# Patient Record
Sex: FEMALE | Race: ASIAN | NOT HISPANIC OR LATINO | URBAN - METROPOLITAN AREA
[De-identification: names, ages, dates, MRNs, and addresses within clinical notes are randomized per-mention and may not be internally consistent; named-entity substitution may affect disease eponyms.]

---

## 2019-06-27 ENCOUNTER — EMERGENCY (EMERGENCY)
Facility: HOSPITAL | Age: 24
LOS: 1 days | Discharge: STILL ADMITTED OR ADMINISTRATIVE | End: 2019-06-27
Admitting: EMERGENCY MEDICINE

## 2019-06-27 ENCOUNTER — INPATIENT (INPATIENT)
Facility: HOSPITAL | Age: 24
LOS: 3 days | Discharge: HOME | End: 2019-07-01
Attending: OBSTETRICS & GYNECOLOGY | Admitting: OBSTETRICS & GYNECOLOGY
Payer: MEDICAID

## 2019-06-27 VITALS
RESPIRATION RATE: 16 BRPM | HEART RATE: 90 BPM | DIASTOLIC BLOOD PRESSURE: 79 MMHG | TEMPERATURE: 98 F | SYSTOLIC BLOOD PRESSURE: 121 MMHG | OXYGEN SATURATION: 98 %

## 2019-06-27 VITALS
RESPIRATION RATE: 16 BRPM | OXYGEN SATURATION: 100 % | HEART RATE: 81 BPM | DIASTOLIC BLOOD PRESSURE: 78 MMHG | TEMPERATURE: 98 F | SYSTOLIC BLOOD PRESSURE: 119 MMHG

## 2019-06-27 DIAGNOSIS — O26.899 OTHER SPECIFIED PREGNANCY RELATED CONDITIONS, UNSPECIFIED TRIMESTER: ICD-10-CM

## 2019-06-27 DIAGNOSIS — Z3A.00 WEEKS OF GESTATION OF PREGNANCY NOT SPECIFIED: ICD-10-CM

## 2019-06-27 LAB
ALBUMIN SERPL ELPH-MCNC: 3.2 G/DL — LOW (ref 3.3–5)
ALP SERPL-CCNC: 150 U/L — HIGH (ref 40–120)
ALT FLD-CCNC: 18 U/L — SIGNIFICANT CHANGE UP (ref 4–33)
AMPHET UR-MCNC: NEGATIVE — SIGNIFICANT CHANGE UP
ANION GAP SERPL CALC-SCNC: 13 MMO/L — SIGNIFICANT CHANGE UP (ref 7–14)
APPEARANCE UR: CLEAR — SIGNIFICANT CHANGE UP
APTT BLD: 27 SEC — LOW (ref 27.5–36.3)
AST SERPL-CCNC: 17 U/L — SIGNIFICANT CHANGE UP (ref 4–32)
BACTERIA # UR AUTO: NEGATIVE — SIGNIFICANT CHANGE UP
BARBITURATES UR SCN-MCNC: NEGATIVE — SIGNIFICANT CHANGE UP
BASOPHILS # BLD AUTO: 0.03 K/UL — SIGNIFICANT CHANGE UP (ref 0–0.2)
BASOPHILS NFR BLD AUTO: 0.2 % — SIGNIFICANT CHANGE UP (ref 0–2)
BENZODIAZ UR-MCNC: NEGATIVE — SIGNIFICANT CHANGE UP
BILIRUB SERPL-MCNC: < 0.2 MG/DL — LOW (ref 0.2–1.2)
BILIRUB UR-MCNC: NEGATIVE — SIGNIFICANT CHANGE UP
BLOOD UR QL VISUAL: SIGNIFICANT CHANGE UP
BUN SERPL-MCNC: 12 MG/DL — SIGNIFICANT CHANGE UP (ref 7–23)
CALCIUM SERPL-MCNC: 9.7 MG/DL — SIGNIFICANT CHANGE UP (ref 8.4–10.5)
CANNABINOIDS UR-MCNC: NEGATIVE — SIGNIFICANT CHANGE UP
CHLORIDE SERPL-SCNC: 104 MMOL/L — SIGNIFICANT CHANGE UP (ref 98–107)
CO2 SERPL-SCNC: 19 MMOL/L — LOW (ref 22–31)
COCAINE METAB.OTHER UR-MCNC: NEGATIVE — SIGNIFICANT CHANGE UP
COLOR SPEC: YELLOW — SIGNIFICANT CHANGE UP
CREAT ?TM UR-MCNC: 173.8 MG/DL — SIGNIFICANT CHANGE UP
CREAT SERPL-MCNC: 0.55 MG/DL — SIGNIFICANT CHANGE UP (ref 0.5–1.3)
EOSINOPHIL # BLD AUTO: 0.08 K/UL — SIGNIFICANT CHANGE UP (ref 0–0.5)
EOSINOPHIL NFR BLD AUTO: 0.7 % — SIGNIFICANT CHANGE UP (ref 0–6)
FIBRINOGEN PPP-MCNC: 887.9 MG/DL — HIGH (ref 350–510)
GLUCOSE BLDC GLUCOMTR-MCNC: 74 MG/DL — SIGNIFICANT CHANGE UP (ref 70–99)
GLUCOSE SERPL-MCNC: 71 MG/DL — SIGNIFICANT CHANGE UP (ref 70–99)
GLUCOSE UR-MCNC: NEGATIVE — SIGNIFICANT CHANGE UP
HCT VFR BLD CALC: 36.7 % — SIGNIFICANT CHANGE UP (ref 34.5–45)
HGB BLD-MCNC: 11.1 G/DL — LOW (ref 11.5–15.5)
HYALINE CASTS # UR AUTO: SIGNIFICANT CHANGE UP
IMM GRANULOCYTES NFR BLD AUTO: 0.7 % — SIGNIFICANT CHANGE UP (ref 0–1.5)
INR BLD: 0.89 — SIGNIFICANT CHANGE UP (ref 0.88–1.17)
KETONES UR-MCNC: NEGATIVE — SIGNIFICANT CHANGE UP
LDH SERPL L TO P-CCNC: 261 U/L — HIGH (ref 135–225)
LEUKOCYTE ESTERASE UR-ACNC: NEGATIVE — SIGNIFICANT CHANGE UP
LYMPHOCYTES # BLD AUTO: 2.69 K/UL — SIGNIFICANT CHANGE UP (ref 1–3.3)
LYMPHOCYTES # BLD AUTO: 22.1 % — SIGNIFICANT CHANGE UP (ref 13–44)
MCHC RBC-ENTMCNC: 25.1 PG — LOW (ref 27–34)
MCHC RBC-ENTMCNC: 30.2 % — LOW (ref 32–36)
MCV RBC AUTO: 82.8 FL — SIGNIFICANT CHANGE UP (ref 80–100)
METHADONE UR-MCNC: NEGATIVE — SIGNIFICANT CHANGE UP
MONOCYTES # BLD AUTO: 0.87 K/UL — SIGNIFICANT CHANGE UP (ref 0–0.9)
MONOCYTES NFR BLD AUTO: 7.1 % — SIGNIFICANT CHANGE UP (ref 2–14)
NEUTROPHILS # BLD AUTO: 8.43 K/UL — HIGH (ref 1.8–7.4)
NEUTROPHILS NFR BLD AUTO: 69.2 % — SIGNIFICANT CHANGE UP (ref 43–77)
NITRITE UR-MCNC: NEGATIVE — SIGNIFICANT CHANGE UP
NRBC # FLD: 0 K/UL — SIGNIFICANT CHANGE UP (ref 0–0)
OPIATES UR-MCNC: NEGATIVE — SIGNIFICANT CHANGE UP
OXYCODONE UR-MCNC: NEGATIVE — SIGNIFICANT CHANGE UP
PCP UR-MCNC: NEGATIVE — SIGNIFICANT CHANGE UP
PH UR: 6.5 — SIGNIFICANT CHANGE UP (ref 5–8)
PLATELET # BLD AUTO: 450 K/UL — HIGH (ref 150–400)
PMV BLD: 9.4 FL — SIGNIFICANT CHANGE UP (ref 7–13)
POTASSIUM SERPL-MCNC: 4.2 MMOL/L — SIGNIFICANT CHANGE UP (ref 3.5–5.3)
POTASSIUM SERPL-SCNC: 4.2 MMOL/L — SIGNIFICANT CHANGE UP (ref 3.5–5.3)
PROT SERPL-MCNC: 7.5 G/DL — SIGNIFICANT CHANGE UP (ref 6–8.3)
PROT UR-MCNC: 261.6 MG/DL — SIGNIFICANT CHANGE UP
PROT UR-MCNC: 300 — HIGH
PROTHROM AB SERPL-ACNC: 9.8 SEC — SIGNIFICANT CHANGE UP (ref 9.8–13.1)
RBC # BLD: 4.43 M/UL — SIGNIFICANT CHANGE UP (ref 3.8–5.2)
RBC # FLD: 15.9 % — HIGH (ref 10.3–14.5)
RBC CASTS # UR COMP ASSIST: HIGH (ref 0–?)
SODIUM SERPL-SCNC: 136 MMOL/L — SIGNIFICANT CHANGE UP (ref 135–145)
SP GR SPEC: 1.03 — SIGNIFICANT CHANGE UP (ref 1–1.04)
SQUAMOUS # UR AUTO: SIGNIFICANT CHANGE UP
URATE SERPL-MCNC: 5.2 MG/DL — SIGNIFICANT CHANGE UP (ref 2.5–7)
UROBILINOGEN FLD QL: SIGNIFICANT CHANGE UP
WBC # BLD: 12.19 K/UL — HIGH (ref 3.8–10.5)
WBC # FLD AUTO: 12.19 K/UL — HIGH (ref 3.8–10.5)
WBC UR QL: SIGNIFICANT CHANGE UP (ref 0–?)

## 2019-06-27 RX ORDER — SODIUM CHLORIDE 9 MG/ML
1000 INJECTION, SOLUTION INTRAVENOUS
Refills: 0 | Status: DISCONTINUED | OUTPATIENT
Start: 2019-06-27 | End: 2019-06-28

## 2019-06-27 RX ORDER — CITRIC ACID/SODIUM CITRATE 300-500 MG
30 SOLUTION, ORAL ORAL ONCE
Refills: 0 | Status: COMPLETED | OUTPATIENT
Start: 2019-06-27 | End: 2019-06-27

## 2019-06-27 NOTE — OB PROVIDER TRIAGE NOTE - NSHPPHYSICALEXAM_GEN_ALL_CORE
VS: BP ranges: 123/87, 137/92, 133/84, 123/82, 118/81  Abd soft, nontender, gravid  SVE: 2/50/-3  TAS: anterior placenta, vertex, BPP=8/8; KELLIE=9.95cm  2cm cyst noted on left side on perineum; pt reports it is from shaving  NST: , moderate variability, accels, no decels  Irregular ctx on toco

## 2019-06-27 NOTE — OB RN TRIAGE NOTE - CHIEF COMPLAINT QUOTE
spotting at 6am/ no active spotting at this time ve in  eoffice 6/26 spotting at 6am/ no active spotting at this time ve in the office 6/26     recheck @ 430pm 138

## 2019-06-27 NOTE — OB PROVIDER TRIAGE NOTE - NSPREVIOUSLIVEBIR_OBGYN_ALL_OB
EXCUSE SLIP    March 6, 2019      Re:   Efrain Jackson  [address] [facility] [id number]                   This is to certify that Efrain Jackson had an appointment at this office for professional attention on: 03/06/19      Please excuse him:    FROM:   DUE TO:     [x] Work  [x]Injury  [] School  []Illness  [] Gym  []Other  [] Other        Comments: PLease excuse from work for the dates of 03/06/19, 03/07/19 and 03/08/19. May return on 03/09/19.    Thank You,           TRE OrdoñezC  [address] [facility] [id number] [phone]     No

## 2019-06-27 NOTE — ED ADULT TRIAGE NOTE - CHIEF COMPLAINT QUOTE
pt is 37 wks pregnant, KELLIE 19, . C/o seeing bright red blood when wiped after urinating. Denies any bleeding at present. Yesterday had mucous discharge . c/o pelvic cramping 6/10 pain scale

## 2019-06-27 NOTE — OB PROVIDER TRIAGE NOTE - HISTORY OF PRESENT ILLNESS
Default pt: Pt receives prenatal care from a Dr. Alisia Orozco at [facility] [place]. 24yo  at 36.6wks, presents to triage with c/o spotting this morning at 6am ("brownish spot of blood"). Pt denies any active bleeding, leakage of fluid; reports good fetal movement. Pt reports she has GDMA1, diet controlled. Reports proteinuria and was supposed to do a 24hr urine today. Reports HA for the past 2 days but denies at this time; denies visual disturbances but reports pain in RUQ "for a while." No prenatal records with pt    Allergies: denies  Meds: PNV, iron  PMH: denies  PSH: denies  OBGYN  -Pt reports proteinuria, denies elevated BPs  -GDMA1  -Denies any hx of STIs

## 2019-06-27 NOTE — OB PROVIDER H&P - ASSESSMENT
Default pt: Pt receives prenatal care from a Dr. Alisia Orozco at [facility] [place]. 24yo  at 36.6wks, presents to triage with c/o spotting this morning at 6am ("brownish spot of blood"). Pt denies any active bleeding, leakage of fluid; reports good fetal movement. Pt reports she has GDMA1, diet controlled. Reports proteinuria and was supposed to do a 24hr urine today. Reports HA for the past 2 days but denies at this time; denies visual disturbances but reports pain in RUQ "for a while." No prenatal records with pt    Allergies: denies  Meds: PNV, iron  PMH: denies  PSH: denies  OBGYN  -Pt reports proteinuria, denies elevated BPs  -GDMA1    Assessment/Plan    VS: BP ranges: 123/87, 137/92, 133/84, 123/82, 118/81  Abd soft, nontender, gravid  SVE: 2/50/-3  2cm cyst noted on left side on perineum; pt reports it is from shaving  TAS: anterior placenta, vertex, BPP=8/8; KELLIE=9.95cm; EFW 2101g  NST: , moderate variability, accels, no decels  Irregular ctx on toco    -D/w Dr. Torrez; HELLP labs sent  -Pt declined Bicitra; reports RUQ subsided  -Awaiting lab results    CBC: 12.19<11.1/36.7>450  Ptt=9.8  APtt=27  INR=0.89  Fibrinogen=887.9    CMP: K=4.2, creatinine=0.55, AST/ALT=17/18, uric acid=5.2, OVS=762    UA: neg ketone, small blood, protein=300, neg nitrite, neg leuk esterase, neg bacteria  Protein/Creatinine Ratio: 1.50    -All findings d/w Dr. Beaulieu/Dr. Shaffer  -Pt to be admitted for 24hr observation and 24hr urine collection  -BP monitoring  -Accuchecks and rotating fluids as per protocol  -Prenatal/routine labs, utox, GBS/urine culture sent

## 2019-06-27 NOTE — OB PROVIDER TRIAGE NOTE - NSOBPROVIDERNOTE_OBGYN_ALL_OB_FT
Default pt: Pt receives prenatal care from a Dr. Alsiia [facility]rozco at [facility] [place]. 24yo  at 36.6wks, presents to triage with c/o spotting this morning at 6am ("brownish spot of blood"). Pt denies any active bleeding, leakage of fluid; reports good fetal movement. Pt reports she has GDMA1, diet controlled. Reports proteinuria and was supposed to do a 24hr urine today. Reports HA for the past 2 days but denies at this time; denies visual disturbances but reports pain in RUQ "for a while." No prenatal records with pt    Allergies: denies  Meds: PNV, iron  PMH: denies  PSH: denies  OBGYN  -Pt reports proteinuria, denies elevated BPs  -GDMA1    Assessment/Plan    VS: BP ranges: 123/87, 137/92, 133/84, 123/82, 118/81  Abd soft, nontender, gravid  SVE: 2/50/-3  2cm cyst noted on left side on perineum; pt reports it is from shaving  TAS: anterior placenta, vertex, BPP=8/8; KELLIE=9.95cm  NST: , moderate variability, accels, no decels  Irregular ctx on toco    -D/w Dr. Torrez; HELLP labs sent  -Pt declined Bicitra; reports RUQ subsided  -Awaiting lab results Default pt: Pt receives prenatal care from a Dr. Alisia Orozco at [facility] [place]. 24yo  at 36.6wks, presents to triage with c/o spotting this morning at 6am ("brownish spot of blood"). Pt denies any active bleeding, leakage of fluid; reports good fetal movement. Pt reports she has GDMA1, diet controlled. Reports proteinuria and was supposed to do a 24hr urine today. Reports HA for the past 2 days but denies at this time; denies visual disturbances but reports pain in RUQ "for a while." No prenatal records with pt    Allergies: denies  Meds: PNV, iron  PMH: denies  PSH: denies  OBGYN  -Pt reports proteinuria, denies elevated BPs  -GDMA1    Assessment/Plan    VS: BP ranges: 123/87, 137/92, 133/84, 123/82, 118/81  Abd soft, nontender, gravid  SVE: 2/50/-3  2cm cyst noted on left side on perineum; pt reports it is from shaving  TAS: anterior placenta, vertex, BPP=8/8; KELLIE=9.95cm  NST: , moderate variability, accels, no decels  Irregular ctx on toco    -D/w Dr. Torrez; HELLP labs sent  -Pt declined Bicitra; reports RUQ subsided  -Awaiting lab results    CBC: 12.19<11.1/36.7>450  Ptt=9.8  APtt=27  INR=0.89  Fibrinogen=887.9    CMP: K=4.2, creatinine=0.55, AST/ALT=17/18, uric acid=5.2, XJQ=313    UA: neg ketone, small blood, protein=300, neg nitrite, neg leuk esterase, neg bacteria  Protein/Creatinine Ratio: 1.50    -All findings d/w Dr. Beaulieu/Dr. Shaffer Default pt: Pt receives prenatal care from a Dr. Alisia Orozco at [facility] [place]. 22yo  at 36.6wks, presents to triage with c/o spotting this morning at 6am ("brownish spot of blood"). Pt denies any active bleeding, leakage of fluid; reports good fetal movement. Pt reports she has GDMA1, diet controlled. Reports proteinuria and was supposed to do a 24hr urine today. Reports HA for the past 2 days but denies at this time; denies visual disturbances but reports pain in RUQ "for a while." No prenatal records with pt    Allergies: denies  Meds: PNV, iron  PMH: denies  PSH: denies  OBGYN  -Pt reports proteinuria, denies elevated BPs  -GDMA1    Assessment/Plan    VS: BP ranges: 123/87, 137/92, 133/84, 123/82, 118/81  Abd soft, nontender, gravid  SVE: 2/50/-3  2cm cyst noted on left side on perineum; pt reports it is from shaving  TAS: anterior placenta, vertex, BPP=8/8; KELLIE=9.95cm  NST: , moderate variability, accels, no decels  Irregular ctx on toco    -D/w Dr. Torrez; HELLP labs sent  -Pt declined Bicitra; reports RUQ subsided  -Awaiting lab results    CBC: 12.19<11.1/36.7>450  Ptt=9.8  APtt=27  INR=0.89  Fibrinogen=887.9    CMP: K=4.2, creatinine=0.55, AST/ALT=17/18, uric acid=5.2, AZT=999    UA: neg ketone, small blood, protein=300, neg nitrite, neg leuk esterase, neg bacteria  Protein/Creatinine Ratio: 1.50    -All findings d/w Dr. Beaulieu/Dr. Shaffer  -Pt to be admitted for 24hr observation and 24hr urine collection  -BP monitoring  -Accuchecks and rotating fluids as per protocol  -Prenatal/routine labs, utox, GBS/urine culture sent

## 2019-06-27 NOTE — OB RN TRIAGE NOTE - NS_TRIAGEADDITIONAL COMMENTS_OBGYN_ALL_OB_FT
acuity time not met due to high patient volume, LORRAINE Vyas/ANNALISA Pritchard and SAV Hensley aware acuity time not met due to high patient volume, C Asael/ANNALISA Pritchard and SAV Hensley aware  last po 2pm  5'3"  158

## 2019-06-27 NOTE — OB PROVIDER H&P - HISTORY OF PRESENT ILLNESS
Default pt: Pt receives prenatal care from a Dr. Alisia Orozco at [facility] [place]. 24yo  at 36.6wks, presents to triage with c/o spotting this morning at 6am ("brownish spot of blood"). Pt denies any active bleeding, leakage of fluid; reports good fetal movement. Pt reports she has GDMA1, diet controlled. Reports proteinuria and was supposed to do a 24hr urine today. Reports HA for the past 2 days but denies at this time; denies visual disturbances but reports pain in RUQ "for a while." No prenatal records with pt    Allergies: denies  Meds: PNV, iron  PMH: denies  PSH: denies  OBGYN  -Pt reports proteinuria, denies elevated BPs  -GDMA1  -Denies any hx of STIs

## 2019-06-28 ENCOUNTER — ASOB RESULT (OUTPATIENT)
Age: 24
End: 2019-06-28

## 2019-06-28 ENCOUNTER — OUTPATIENT (OUTPATIENT)
Dept: OUTPATIENT SERVICES | Facility: HOSPITAL | Age: 24
LOS: 1 days | End: 2019-06-28

## 2019-06-28 ENCOUNTER — TRANSCRIPTION ENCOUNTER (OUTPATIENT)
Age: 24
End: 2019-06-28

## 2019-06-28 ENCOUNTER — APPOINTMENT (OUTPATIENT)
Dept: ANTEPARTUM | Facility: HOSPITAL | Age: 24
End: 2019-06-28
Payer: COMMERCIAL

## 2019-06-28 DIAGNOSIS — Z04.3 ENCOUNTER FOR EXAMINATION AND OBSERVATION FOLLOWING OTHER ACCIDENT: ICD-10-CM

## 2019-06-28 LAB
ALBUMIN SERPL ELPH-MCNC: 3.1 G/DL — LOW (ref 3.3–5)
ALBUMIN SERPL ELPH-MCNC: 3.1 G/DL — LOW (ref 3.3–5)
ALP SERPL-CCNC: 146 U/L — HIGH (ref 40–120)
ALP SERPL-CCNC: 149 U/L — HIGH (ref 40–120)
ALT FLD-CCNC: 18 U/L — SIGNIFICANT CHANGE UP (ref 4–33)
ALT FLD-CCNC: 18 U/L — SIGNIFICANT CHANGE UP (ref 4–33)
ANION GAP SERPL CALC-SCNC: 10 MMO/L — SIGNIFICANT CHANGE UP (ref 7–14)
ANION GAP SERPL CALC-SCNC: 13 MMO/L — SIGNIFICANT CHANGE UP (ref 7–14)
APTT BLD: 27.9 SEC — SIGNIFICANT CHANGE UP (ref 27.5–36.3)
APTT BLD: 28.4 SEC — SIGNIFICANT CHANGE UP (ref 27.5–36.3)
AST SERPL-CCNC: 17 U/L — SIGNIFICANT CHANGE UP (ref 4–32)
AST SERPL-CCNC: 18 U/L — SIGNIFICANT CHANGE UP (ref 4–32)
BASOPHILS # BLD AUTO: 0.03 K/UL — SIGNIFICANT CHANGE UP (ref 0–0.2)
BASOPHILS # BLD AUTO: 0.03 K/UL — SIGNIFICANT CHANGE UP (ref 0–0.2)
BASOPHILS NFR BLD AUTO: 0.3 % — SIGNIFICANT CHANGE UP (ref 0–2)
BASOPHILS NFR BLD AUTO: 0.3 % — SIGNIFICANT CHANGE UP (ref 0–2)
BILIRUB SERPL-MCNC: < 0.2 MG/DL — LOW (ref 0.2–1.2)
BILIRUB SERPL-MCNC: < 0.2 MG/DL — LOW (ref 0.2–1.2)
BLD GP AB SCN SERPL QL: NEGATIVE — SIGNIFICANT CHANGE UP
BUN SERPL-MCNC: 10 MG/DL — SIGNIFICANT CHANGE UP (ref 7–23)
BUN SERPL-MCNC: 8 MG/DL — SIGNIFICANT CHANGE UP (ref 7–23)
CALCIUM SERPL-MCNC: 9.6 MG/DL — SIGNIFICANT CHANGE UP (ref 8.4–10.5)
CALCIUM SERPL-MCNC: 9.8 MG/DL — SIGNIFICANT CHANGE UP (ref 8.4–10.5)
CHLORIDE SERPL-SCNC: 105 MMOL/L — SIGNIFICANT CHANGE UP (ref 98–107)
CHLORIDE SERPL-SCNC: 108 MMOL/L — HIGH (ref 98–107)
CO2 SERPL-SCNC: 18 MMOL/L — LOW (ref 22–31)
CO2 SERPL-SCNC: 21 MMOL/L — LOW (ref 22–31)
CREAT SERPL-MCNC: 0.48 MG/DL — LOW (ref 0.5–1.3)
CREAT SERPL-MCNC: 0.51 MG/DL — SIGNIFICANT CHANGE UP (ref 0.5–1.3)
EOSINOPHIL # BLD AUTO: 0.08 K/UL — SIGNIFICANT CHANGE UP (ref 0–0.5)
EOSINOPHIL # BLD AUTO: 0.12 K/UL — SIGNIFICANT CHANGE UP (ref 0–0.5)
EOSINOPHIL NFR BLD AUTO: 0.7 % — SIGNIFICANT CHANGE UP (ref 0–6)
EOSINOPHIL NFR BLD AUTO: 1.1 % — SIGNIFICANT CHANGE UP (ref 0–6)
FIBRINOGEN PPP-MCNC: 870.6 MG/DL — HIGH (ref 350–510)
FIBRINOGEN PPP-MCNC: 905 MG/DL — HIGH (ref 350–510)
GLUCOSE BLDC GLUCOMTR-MCNC: 70 MG/DL — SIGNIFICANT CHANGE UP (ref 70–99)
GLUCOSE BLDC GLUCOMTR-MCNC: 90 MG/DL — SIGNIFICANT CHANGE UP (ref 70–99)
GLUCOSE BLDC GLUCOMTR-MCNC: 92 MG/DL — SIGNIFICANT CHANGE UP (ref 70–99)
GLUCOSE SERPL-MCNC: 104 MG/DL — HIGH (ref 70–99)
GLUCOSE SERPL-MCNC: 79 MG/DL — SIGNIFICANT CHANGE UP (ref 70–99)
HBV SURFACE AG SER-ACNC: NEGATIVE — SIGNIFICANT CHANGE UP
HCT VFR BLD CALC: 34.9 % — SIGNIFICANT CHANGE UP (ref 34.5–45)
HCT VFR BLD CALC: 35.8 % — SIGNIFICANT CHANGE UP (ref 34.5–45)
HGB BLD-MCNC: 10.9 G/DL — LOW (ref 11.5–15.5)
HGB BLD-MCNC: 11 G/DL — LOW (ref 11.5–15.5)
HIV COMBO RESULT: SIGNIFICANT CHANGE UP
HIV1+2 AB SPEC QL: SIGNIFICANT CHANGE UP
IMM GRANULOCYTES NFR BLD AUTO: 0.8 % — SIGNIFICANT CHANGE UP (ref 0–1.5)
IMM GRANULOCYTES NFR BLD AUTO: 0.9 % — SIGNIFICANT CHANGE UP (ref 0–1.5)
INR BLD: 0.81 — LOW (ref 0.88–1.17)
INR BLD: 0.87 — LOW (ref 0.88–1.17)
LDH SERPL L TO P-CCNC: 205 U/L — SIGNIFICANT CHANGE UP (ref 135–225)
LDH SERPL L TO P-CCNC: 209 U/L — SIGNIFICANT CHANGE UP (ref 135–225)
LYMPHOCYTES # BLD AUTO: 18.8 % — SIGNIFICANT CHANGE UP (ref 13–44)
LYMPHOCYTES # BLD AUTO: 2.22 K/UL — SIGNIFICANT CHANGE UP (ref 1–3.3)
LYMPHOCYTES # BLD AUTO: 2.27 K/UL — SIGNIFICANT CHANGE UP (ref 1–3.3)
LYMPHOCYTES # BLD AUTO: 20.1 % — SIGNIFICANT CHANGE UP (ref 13–44)
MCHC RBC-ENTMCNC: 24.4 PG — LOW (ref 27–34)
MCHC RBC-ENTMCNC: 24.9 PG — LOW (ref 27–34)
MCHC RBC-ENTMCNC: 30.4 % — LOW (ref 32–36)
MCHC RBC-ENTMCNC: 31.5 % — LOW (ref 32–36)
MCV RBC AUTO: 79 FL — LOW (ref 80–100)
MCV RBC AUTO: 80.3 FL — SIGNIFICANT CHANGE UP (ref 80–100)
MONOCYTES # BLD AUTO: 0.66 K/UL — SIGNIFICANT CHANGE UP (ref 0–0.9)
MONOCYTES # BLD AUTO: 1.24 K/UL — HIGH (ref 0–0.9)
MONOCYTES NFR BLD AUTO: 10.5 % — SIGNIFICANT CHANGE UP (ref 2–14)
MONOCYTES NFR BLD AUTO: 5.9 % — SIGNIFICANT CHANGE UP (ref 2–14)
NEUTROPHILS # BLD AUTO: 8.1 K/UL — HIGH (ref 1.8–7.4)
NEUTROPHILS # BLD AUTO: 8.12 K/UL — HIGH (ref 1.8–7.4)
NEUTROPHILS NFR BLD AUTO: 68.9 % — SIGNIFICANT CHANGE UP (ref 43–77)
NEUTROPHILS NFR BLD AUTO: 71.7 % — SIGNIFICANT CHANGE UP (ref 43–77)
NRBC # FLD: 0 K/UL — SIGNIFICANT CHANGE UP (ref 0–0)
NRBC # FLD: 0 K/UL — SIGNIFICANT CHANGE UP (ref 0–0)
PLATELET # BLD AUTO: 417 K/UL — HIGH (ref 150–400)
PLATELET # BLD AUTO: 442 K/UL — HIGH (ref 150–400)
PMV BLD: 8.9 FL — SIGNIFICANT CHANGE UP (ref 7–13)
PMV BLD: 9.2 FL — SIGNIFICANT CHANGE UP (ref 7–13)
POTASSIUM SERPL-MCNC: 4.2 MMOL/L — SIGNIFICANT CHANGE UP (ref 3.5–5.3)
POTASSIUM SERPL-MCNC: 4.4 MMOL/L — SIGNIFICANT CHANGE UP (ref 3.5–5.3)
POTASSIUM SERPL-SCNC: 4.2 MMOL/L — SIGNIFICANT CHANGE UP (ref 3.5–5.3)
POTASSIUM SERPL-SCNC: 4.4 MMOL/L — SIGNIFICANT CHANGE UP (ref 3.5–5.3)
PROT SERPL-MCNC: 7 G/DL — SIGNIFICANT CHANGE UP (ref 6–8.3)
PROT SERPL-MCNC: 7.2 G/DL — SIGNIFICANT CHANGE UP (ref 6–8.3)
PROTHROM AB SERPL-ACNC: 9.2 SEC — LOW (ref 9.8–13.1)
PROTHROM AB SERPL-ACNC: 9.6 SEC — LOW (ref 9.8–13.1)
RBC # BLD: 4.42 M/UL — SIGNIFICANT CHANGE UP (ref 3.8–5.2)
RBC # BLD: 4.46 M/UL — SIGNIFICANT CHANGE UP (ref 3.8–5.2)
RBC # FLD: 15.8 % — HIGH (ref 10.3–14.5)
RBC # FLD: 15.9 % — HIGH (ref 10.3–14.5)
RH IG SCN BLD-IMP: POSITIVE — SIGNIFICANT CHANGE UP
RUBV IGG SER-ACNC: 3.3 INDEX — SIGNIFICANT CHANGE UP
RUBV IGG SER-IMP: POSITIVE — SIGNIFICANT CHANGE UP
SODIUM SERPL-SCNC: 136 MMOL/L — SIGNIFICANT CHANGE UP (ref 135–145)
SODIUM SERPL-SCNC: 139 MMOL/L — SIGNIFICANT CHANGE UP (ref 135–145)
T PALLIDUM AB TITR SER: NEGATIVE — SIGNIFICANT CHANGE UP
URATE SERPL-MCNC: 4.8 MG/DL — SIGNIFICANT CHANGE UP (ref 2.5–7)
URATE SERPL-MCNC: 5.1 MG/DL — SIGNIFICANT CHANGE UP (ref 2.5–7)
WBC # BLD: 11.28 K/UL — HIGH (ref 3.8–10.5)
WBC # BLD: 11.78 K/UL — HIGH (ref 3.8–10.5)
WBC # FLD AUTO: 11.28 K/UL — HIGH (ref 3.8–10.5)
WBC # FLD AUTO: 11.78 K/UL — HIGH (ref 3.8–10.5)

## 2019-06-28 PROCEDURE — 76819 FETAL BIOPHYS PROFIL W/O NST: CPT | Mod: 26

## 2019-06-28 PROCEDURE — 76811 OB US DETAILED SNGL FETUS: CPT | Mod: 26

## 2019-06-28 PROCEDURE — 99221 1ST HOSP IP/OBS SF/LOW 40: CPT | Mod: 25

## 2019-06-28 PROCEDURE — 76818 FETAL BIOPHYS PROFILE W/NST: CPT | Mod: 59

## 2019-06-28 RX ORDER — FERROUS SULFATE 325(65) MG
325 TABLET ORAL DAILY
Refills: 0 | Status: DISCONTINUED | OUTPATIENT
Start: 2019-06-28 | End: 2019-07-01

## 2019-06-28 RX ORDER — SODIUM CHLORIDE 9 MG/ML
1000 INJECTION, SOLUTION INTRAVENOUS
Refills: 0 | Status: COMPLETED | OUTPATIENT
Start: 2019-06-28 | End: 2019-06-28

## 2019-06-28 RX ORDER — BUTORPHANOL TARTRATE 2 MG/ML
2 INJECTION, SOLUTION INTRAMUSCULAR; INTRAVENOUS ONCE
Refills: 0 | Status: DISCONTINUED | OUTPATIENT
Start: 2019-06-28 | End: 2019-06-28

## 2019-06-28 RX ORDER — SODIUM CHLORIDE 9 MG/ML
1000 INJECTION, SOLUTION INTRAVENOUS
Refills: 0 | Status: DISCONTINUED | OUTPATIENT
Start: 2019-06-28 | End: 2019-06-30

## 2019-06-28 RX ORDER — CITRIC ACID/SODIUM CITRATE 300-500 MG
15 SOLUTION, ORAL ORAL EVERY 6 HOURS
Refills: 0 | Status: DISCONTINUED | OUTPATIENT
Start: 2019-06-28 | End: 2019-06-29

## 2019-06-28 RX ORDER — OXYTOCIN 10 UNIT/ML
333.33 VIAL (ML) INJECTION
Qty: 20 | Refills: 0 | Status: DISCONTINUED | OUTPATIENT
Start: 2019-06-28 | End: 2019-06-30

## 2019-06-28 RX ORDER — SODIUM CHLORIDE 9 MG/ML
1000 INJECTION INTRAMUSCULAR; INTRAVENOUS; SUBCUTANEOUS
Refills: 0 | Status: DISCONTINUED | OUTPATIENT
Start: 2019-06-28 | End: 2019-06-28

## 2019-06-28 RX ORDER — SODIUM CHLORIDE 9 MG/ML
1000 INJECTION, SOLUTION INTRAVENOUS
Refills: 0 | Status: DISCONTINUED | OUTPATIENT
Start: 2019-06-28 | End: 2019-06-28

## 2019-06-28 RX ORDER — DOCUSATE SODIUM 100 MG
100 CAPSULE ORAL DAILY
Refills: 0 | Status: DISCONTINUED | OUTPATIENT
Start: 2019-06-28 | End: 2019-07-01

## 2019-06-28 RX ORDER — ONDANSETRON 8 MG/1
4 TABLET, FILM COATED ORAL ONCE
Refills: 0 | Status: DISCONTINUED | OUTPATIENT
Start: 2019-06-28 | End: 2019-06-30

## 2019-06-28 RX ORDER — OXYTOCIN 10 UNIT/ML
2 VIAL (ML) INJECTION
Qty: 30 | Refills: 0 | Status: DISCONTINUED | OUTPATIENT
Start: 2019-06-28 | End: 2019-06-30

## 2019-06-28 RX ADMIN — BUTORPHANOL TARTRATE 2 MILLIGRAM(S): 2 INJECTION, SOLUTION INTRAMUSCULAR; INTRAVENOUS at 11:20

## 2019-06-28 RX ADMIN — Medication 2 MILLIUNIT(S)/MIN: at 15:32

## 2019-06-28 RX ADMIN — SODIUM CHLORIDE 125 MILLILITER(S): 9 INJECTION, SOLUTION INTRAVENOUS at 11:10

## 2019-06-28 RX ADMIN — SODIUM CHLORIDE 125 MILLILITER(S): 9 INJECTION, SOLUTION INTRAVENOUS at 00:14

## 2019-06-28 RX ADMIN — BUTORPHANOL TARTRATE 2 MILLIGRAM(S): 2 INJECTION, SOLUTION INTRAMUSCULAR; INTRAVENOUS at 11:07

## 2019-06-28 NOTE — DISCHARGE NOTE OB - HOSPITAL COURSE
Patient underwent  c/b preeclampsia followed by uncomplicated postpartum course. EBL:400 Hct:35.8->27  On postpartum day 2 patient was discharged home in stable condition voiding spontaneously and with normal vital signs

## 2019-06-28 NOTE — DISCHARGE NOTE OB - PHYSICIAN SECTION COMPLETE
Preop instructions: NPO solids/ milk products after midnight or clears up to 2 hours before arrival (clear liquids are: water, apple juice, Gatorade & Jell-O, black coffee/no milk, cream or creamer), shower instructions, directions, leave all valuables at home, medication instructions for PM prior & am of procedure explained. Patient stated an understanding.      Patient denies any side effects or issues with anesthesia or sedation.                                                                                                                                      
Yes

## 2019-06-28 NOTE — CHART NOTE - NSCHARTNOTEFT_GEN_A_CORE
NP labor note:    Patient complains of increased pain with contractions and increased vaginal pressure. Pt received stadol about 2 hours ago.     PE:  ICU Vital Signs Last 24 Hrs  T(C): 36.7 (28 Jun 2019 05:32), Max: 36.9 (27 Jun 2019 14:41)  T(F): 98.1 (28 Jun 2019 05:32), Max: 98.5 (27 Jun 2019 14:41)  HR: 74 (28 Jun 2019 13:24) (56 - 90)  BP: 121/75 (28 Jun 2019 12:09) (111/71 - 137/92)  BP(mean): --  ABP: --  ABP(mean): --  RR: 16 (28 Jun 2019 05:32) (15 - 16)  SpO2: 98% (28 Jun 2019 13:24) (97% - 100%)  EFM: 130 moderate variability + acels no decels  [place]: Q3-5 (toco adjusted for better monitoring)  VE:3/80/-2    HELLP labs WNL    Plan:  -continue PO cytotec  -continue efm/toco  -continue BP monitoring  - called to transfer to L&D for possible epidural placement    d/w Dr. Bernard Ramos [facility]

## 2019-06-28 NOTE — DISCHARGE NOTE OB - PATIENT PORTAL LINK FT
You can access the [facility] Patient Portal, offered by [facility], by registering with the following website: [email or web address]

## 2019-06-28 NOTE — DISCHARGE NOTE OB - PLAN OF CARE
Recover Make your follow-up appointment with your doctor in 1 week for a blood pressure check, and in 6 weeks for postpartum visit. No heavy lifting, driving, or strenuous activity for 6 weeks. Nothing per vagina such as tampons, intercourse, douches, or tub baths for 6 weeks or until you see your doctor. Call your doctor with any signs and symptoms of infection such as fever, chills, nausea, or vomiting. Call your doctor if you're unable to tolerate food, increase in vaginal bleeding, or have difficulty urinating. Call your doctor if you have pain that is not relieved by your prescribed medications. Notify your doctor with any other concerns.   Call [phone] if you have any of these concerns in the next 6 weeks.

## 2019-06-28 NOTE — DISCHARGE NOTE OB - MEDICATION SUMMARY - MEDICATIONS TO TAKE
I will START or STAY ON the medications listed below when I get home from the hospital:    acetaminophen 325 mg oral tablet  -- 3 tab(s) by mouth   -- Indication: For pain    ibuprofen 600 mg oral tablet  -- 1 tab(s) by mouth every 6 hours  -- Indication: For pain    Prena1 oral capsule  -- 1 cap(s) by mouth once a day  -- Indication: For breatfeeding    Slow Fe  -- Indication: For home med    norethindrone 0.35 mg oral tablet  -- 1 tab(s) by mouth once a day   -- Do not take this drug if you are pregnant.  It is very important that you take or use this exactly as directed.  Do not skip doses or discontinue unless directed by your doctor.    -- Indication: For birth control

## 2019-06-28 NOTE — CHART NOTE - NSCHARTNOTEFT_GEN_A_CORE
Patient evaluated bedside for increased pain with contractions.    T(C): 36.5 (15:00)  HR: 70 (16:58)  BP: 129/89 (14:31)  RR: 20 (14:06)  SpO2: 99% (16:58)    SVE: 3/80/-3 (unchanged)  EFM: 130BPM, moderate variability, +accels, -decels  [place]:  CTX irregular    A/P: Continue with pitocin for IOL. For epidural for pain control.    Gabbie Holman, PGY1  D/W Dr. Wagner.

## 2019-06-28 NOTE — DISCHARGE NOTE OB - ADDITIONAL INSTRUCTIONS
As per pt, she will return to Dr. Sallie Orozco for OB f/u at [phone] As per pt, [facility] at [phone]

## 2019-06-28 NOTE — DISCHARGE NOTE OB - CARE PLAN
Principal Discharge DX:	Vaginal delivery  Goal:	Recover  Assessment and plan of treatment:	Make your follow-up appointment with your doctor in 1 week for a blood pressure check, and in 6 weeks for postpartum visit. No heavy lifting, driving, or strenuous activity for 6 weeks. Nothing per vagina such as tampons, intercourse, douches, or tub baths for 6 weeks or until you see your doctor. Call your doctor with any signs and symptoms of infection such as fever, chills, nausea, or vomiting. Call your doctor if you're unable to tolerate food, increase in vaginal bleeding, or have difficulty urinating. Call your doctor if you have pain that is not relieved by your prescribed medications. Notify your doctor with any other concerns.   Call [phone] if you have any of these concerns in the next 6 weeks.

## 2019-06-28 NOTE — OB PROVIDER IHI INDUCTION/AUGMENTATION NOTE - NS_CHECKALL_OBGYN_ALL_OB
Contractions pattern was reviewed/Order was written/FHR was reviewed/H&P was completed/Induction / Augmentation was discussed

## 2019-06-28 NOTE — CHART NOTE - NSCHARTNOTEFT_GEN_A_CORE
Antepartum Note:    Patient admitted overnight for monitoring; with mildly elevated BPs and P/C ratio of 1.5 at 37 weeks. Pt initially presenting with contractions and vaginal bleeding. Pt denies headaches, visual changes, RUQ pain, N/V. Patient now having 8/10 painful contractions every 5 minutes requesting pain medications.     PE:  ICU Vital Signs Last 24 Hrs  T(C): 36.7 (28 Jun 2019 05:32), Max: 36.9 (27 Jun 2019 14:41)  T(F): 98.1 (28 Jun 2019 05:32), Max: 98.5 (27 Jun 2019 14:41)  HR: 80 (28 Jun 2019 08:58) (62 - 90)  BP: 116/72 (28 Jun 2019 08:58) (111/81 - 137/92)  BP(mean): --  ABP: --  ABP(mean): --  RR: 16 (28 Jun 2019 05:32) (15 - 16)  SpO2: 99% (28 Jun 2019 05:32) (98% - 100%)    EFM: 145 moderate variability + acels no decels  [place]: irregular  VE:2.5/70/-2    Pt is a P0 at 37 weeks presenting with elevated BPs and proteinuria, now in early labor; GBS unknown, PNC at [facility] c/b GDM A1  -pain medication (IV stadol)  - continue to monitor BP  - IV hydration  - Glucose monitoring  - for IOL if no further cervical change  - efm/toco    d/w Dr. Bearden ([facility]), Dr Peña [facility] attending, Dr. Gomes PGY3    Linda Ramos [facility] Antepartum Note:    Patient admitted overnight for monitoring; with mildly elevated BPs and P/C ratio of 1.5 at 37 weeks. Pt initially presenting with contractions and vaginal bleeding. Pt denies headaches, visual changes, RUQ pain, N/V. Patient now having 8/10 painful contractions every 5 minutes requesting pain medications.     PE:  ICU Vital Signs Last 24 Hrs  T(C): 36.7 (28 Jun 2019 05:32), Max: 36.9 (27 Jun 2019 14:41)  T(F): 98.1 (28 Jun 2019 05:32), Max: 98.5 (27 Jun 2019 14:41)  HR: 80 (28 Jun 2019 08:58) (62 - 90)  BP: 116/72 (28 Jun 2019 08:58) (111/81 - 137/92)  BP(mean): --  ABP: --  ABP(mean): --  RR: 16 (28 Jun 2019 05:32) (15 - 16)  SpO2: 99% (28 Jun 2019 05:32) (98% - 100%)    EFM: 145 moderate variability + acels no decels  [place]: irregular  VE:2.5/70/-2    Pt is a P0 at 37 weeks presenting with elevated BPs and proteinuria, now in early labor; GBS unknown, PNC at [facility] c/b GDM A1  -pain medication (IV stadol)  - continue to monitor BP  - IV hydration  - Glucose monitoring  - for IOL if no further cervical change  - efm/toco  - ATU sono being done at this time    d/w Dr. Bearden ([facility]), Dr Peña [facility] [place], Dr. Gomes PGY3    Linda Ramos [facility] Antepartum Note:    Patient admitted overnight for monitoring; with mildly elevated BPs and P/C ratio of 1.5 at 37 weeks. Pt initially presenting with contractions and vaginal bleeding. Pt denies headaches, visual changes, RUQ pain, N/V. Patient now having 8/10 painful contractions every 5 minutes requesting pain medications.     PE:  ICU Vital Signs Last 24 Hrs  T(C): 36.7 (28 Jun 2019 05:32), Max: 36.9 (27 Jun 2019 14:41)  T(F): 98.1 (28 Jun 2019 05:32), Max: 98.5 (27 Jun 2019 14:41)  HR: 80 (28 Jun 2019 08:58) (62 - 90)  BP: 116/72 (28 Jun 2019 08:58) (111/81 - 137/92)  BP(mean): --  ABP: --  ABP(mean): --  RR: 16 (28 Jun 2019 05:32) (15 - 16)  SpO2: 99% (28 Jun 2019 05:32) (98% - 100%)    EFM: 145 moderate variability + acels no decels  [place]: irregular  VE:2.5/70/-2    Pt is a P0 at 37 weeks presenting with elevated BPs and proteinuria, now in early labor; GBS unknown, PNC at [facility] c/b GDM A1  -pain medication (IV stadol)  - continue to monitor BP  - IV hydration  - Glucose monitoring  - for IOL if no further cervical change  - efm/toco  - ATU sono being done at this time    d/w Dr. Bearden ([facility]), Dr Peña [facility] attending, Dr. Gomes PGY3    Linda Ramos [facility] Attending  I was present with resident/NP during the critical or key portions of the evaluation and management. I discussed the case with the resident/NP  and agree with the findings and plan as documented in the resident’s/NP's  note.

## 2019-06-28 NOTE — CHART NOTE - NSCHARTNOTEFT_GEN_A_CORE
Ob     Assumed care of pt at 1800     She is 24 yo  at term here for IOL for pre-eclampsia without severe features and A1GDM   She progressed to 5 cm   Currently on pitocin   FHTS 155 mod gus no decels + accels  TOCO q 2-4 min   Anticiapte      Terri Bernal MD MSc

## 2019-06-28 NOTE — DISCHARGE NOTE OB - MATERIALS PROVIDED
Breastfeeding Log/Bottle Feeding Log/Shaken Baby Prevention Handout/Breastfeeding Guide and Packet/Birth Certificate Instructions/  Immunization Record/MMR Vaccination (VIS Pub Date: 2012)/Tdap Vaccination (VIS Pub Date: 2012)/Vaccinations/Discharge Medication Information for Patients and Families Pocket Guide/Breastfeeding Mother’s Support Group Information/Guide to Postpartum Care/[facility] Hearing Screen Program/[facility] [place] Screening Program

## 2019-06-29 LAB
ALBUMIN SERPL ELPH-MCNC: 2.4 G/DL — LOW (ref 3.3–5)
ALP SERPL-CCNC: 112 U/L — SIGNIFICANT CHANGE UP (ref 40–120)
ALT FLD-CCNC: 16 U/L — SIGNIFICANT CHANGE UP (ref 4–33)
ANION GAP SERPL CALC-SCNC: 9 MMO/L — SIGNIFICANT CHANGE UP (ref 7–14)
APTT BLD: 26.1 SEC — LOW (ref 27.5–36.3)
AST SERPL-CCNC: 24 U/L — SIGNIFICANT CHANGE UP (ref 4–32)
BASOPHILS # BLD AUTO: 0.02 K/UL — SIGNIFICANT CHANGE UP (ref 0–0.2)
BASOPHILS NFR BLD AUTO: 0.1 % — SIGNIFICANT CHANGE UP (ref 0–2)
BILIRUB SERPL-MCNC: < 0.2 MG/DL — LOW (ref 0.2–1.2)
BUN SERPL-MCNC: 10 MG/DL — SIGNIFICANT CHANGE UP (ref 7–23)
CALCIUM SERPL-MCNC: 9.2 MG/DL — SIGNIFICANT CHANGE UP (ref 8.4–10.5)
CHLORIDE SERPL-SCNC: 108 MMOL/L — HIGH (ref 98–107)
CO2 SERPL-SCNC: 19 MMOL/L — LOW (ref 22–31)
CREAT SERPL-MCNC: 0.73 MG/DL — SIGNIFICANT CHANGE UP (ref 0.5–1.3)
EOSINOPHIL # BLD AUTO: 0 K/UL — SIGNIFICANT CHANGE UP (ref 0–0.5)
EOSINOPHIL NFR BLD AUTO: 0 % — SIGNIFICANT CHANGE UP (ref 0–6)
FIBRINOGEN PPP-MCNC: 779.4 MG/DL — HIGH (ref 350–510)
GLUCOSE BLDC GLUCOMTR-MCNC: 109 MG/DL — HIGH (ref 70–99)
GLUCOSE SERPL-MCNC: 109 MG/DL — HIGH (ref 70–99)
HCT VFR BLD CALC: 27 % — LOW (ref 34.5–45)
HGB BLD-MCNC: 8.7 G/DL — LOW (ref 11.5–15.5)
IMM GRANULOCYTES NFR BLD AUTO: 0.7 % — SIGNIFICANT CHANGE UP (ref 0–1.5)
INR BLD: 0.9 — SIGNIFICANT CHANGE UP (ref 0.88–1.17)
LDH SERPL L TO P-CCNC: 259 U/L — HIGH (ref 135–225)
LYMPHOCYTES # BLD AUTO: 1.55 K/UL — SIGNIFICANT CHANGE UP (ref 1–3.3)
LYMPHOCYTES # BLD AUTO: 8.7 % — LOW (ref 13–44)
MCHC RBC-ENTMCNC: 25.7 PG — LOW (ref 27–34)
MCHC RBC-ENTMCNC: 32.2 % — SIGNIFICANT CHANGE UP (ref 32–36)
MCV RBC AUTO: 79.6 FL — LOW (ref 80–100)
MONOCYTES # BLD AUTO: 1.11 K/UL — HIGH (ref 0–0.9)
MONOCYTES NFR BLD AUTO: 6.3 % — SIGNIFICANT CHANGE UP (ref 2–14)
NEUTROPHILS # BLD AUTO: 14.92 K/UL — HIGH (ref 1.8–7.4)
NEUTROPHILS NFR BLD AUTO: 84.2 % — HIGH (ref 43–77)
NRBC # FLD: 0 K/UL — SIGNIFICANT CHANGE UP (ref 0–0)
PLATELET # BLD AUTO: 341 K/UL — SIGNIFICANT CHANGE UP (ref 150–400)
PMV BLD: 9.4 FL — SIGNIFICANT CHANGE UP (ref 7–13)
POTASSIUM SERPL-MCNC: 4.1 MMOL/L — SIGNIFICANT CHANGE UP (ref 3.5–5.3)
POTASSIUM SERPL-SCNC: 4.1 MMOL/L — SIGNIFICANT CHANGE UP (ref 3.5–5.3)
PROT SERPL-MCNC: 5.6 G/DL — LOW (ref 6–8.3)
PROTHROM AB SERPL-ACNC: 10 SEC — SIGNIFICANT CHANGE UP (ref 9.8–13.1)
RBC # BLD: 3.39 M/UL — LOW (ref 3.8–5.2)
RBC # FLD: 15.9 % — HIGH (ref 10.3–14.5)
SODIUM SERPL-SCNC: 136 MMOL/L — SIGNIFICANT CHANGE UP (ref 135–145)
SPECIMEN SOURCE: SIGNIFICANT CHANGE UP
URATE SERPL-MCNC: 5.7 MG/DL — SIGNIFICANT CHANGE UP (ref 2.5–7)
WBC # BLD: 17.72 K/UL — HIGH (ref 3.8–10.5)
WBC # FLD AUTO: 17.72 K/UL — HIGH (ref 3.8–10.5)

## 2019-06-29 PROCEDURE — 59409 OBSTETRICAL CARE: CPT | Mod: U9,UB,GC

## 2019-06-29 RX ORDER — GLYCERIN ADULT
1 SUPPOSITORY, RECTAL RECTAL AT BEDTIME
Refills: 0 | Status: DISCONTINUED | OUTPATIENT
Start: 2019-06-29 | End: 2019-07-01

## 2019-06-29 RX ORDER — ACETAMINOPHEN 500 MG
975 TABLET ORAL
Refills: 0 | Status: DISCONTINUED | OUTPATIENT
Start: 2019-06-29 | End: 2019-07-01

## 2019-06-29 RX ORDER — OXYTOCIN 10 UNIT/ML
333.33 VIAL (ML) INJECTION
Qty: 20 | Refills: 0 | Status: DISCONTINUED | OUTPATIENT
Start: 2019-06-29 | End: 2019-06-30

## 2019-06-29 RX ORDER — KETOROLAC TROMETHAMINE 30 MG/ML
30 SYRINGE (ML) INJECTION ONCE
Refills: 0 | Status: DISCONTINUED | OUTPATIENT
Start: 2019-06-29 | End: 2019-06-29

## 2019-06-29 RX ORDER — SODIUM CHLORIDE 9 MG/ML
3 INJECTION INTRAMUSCULAR; INTRAVENOUS; SUBCUTANEOUS EVERY 8 HOURS
Refills: 0 | Status: DISCONTINUED | OUTPATIENT
Start: 2019-06-29 | End: 2019-07-01

## 2019-06-29 RX ORDER — TETANUS TOXOID, REDUCED DIPHTHERIA TOXOID AND ACELLULAR PERTUSSIS VACCINE, ADSORBED 5; 2.5; 8; 8; 2.5 [IU]/.5ML; [IU]/.5ML; UG/.5ML; UG/.5ML; UG/.5ML
0.5 SUSPENSION INTRAMUSCULAR ONCE
Refills: 0 | Status: DISCONTINUED | OUTPATIENT
Start: 2019-06-29 | End: 2019-07-01

## 2019-06-29 RX ORDER — AER TRAVELER 0.5 G/1
1 SOLUTION RECTAL; TOPICAL EVERY 4 HOURS
Refills: 0 | Status: DISCONTINUED | OUTPATIENT
Start: 2019-06-29 | End: 2019-07-01

## 2019-06-29 RX ORDER — OXYCODONE HYDROCHLORIDE 5 MG/1
5 TABLET ORAL ONCE
Refills: 0 | Status: DISCONTINUED | OUTPATIENT
Start: 2019-06-29 | End: 2019-07-01

## 2019-06-29 RX ORDER — IBUPROFEN 200 MG
600 TABLET ORAL EVERY 6 HOURS
Refills: 0 | Status: COMPLETED | OUTPATIENT
Start: 2019-06-29 | End: 2020-05-27

## 2019-06-29 RX ORDER — FAMOTIDINE 10 MG/ML
20 INJECTION INTRAVENOUS ONCE
Refills: 0 | Status: COMPLETED | OUTPATIENT
Start: 2019-06-29 | End: 2019-06-29

## 2019-06-29 RX ORDER — IBUPROFEN 200 MG
600 TABLET ORAL EVERY 6 HOURS
Refills: 0 | Status: DISCONTINUED | OUTPATIENT
Start: 2019-06-29 | End: 2019-07-01

## 2019-06-29 RX ORDER — DIBUCAINE 1 %
1 OINTMENT (GRAM) RECTAL EVERY 6 HOURS
Refills: 0 | Status: DISCONTINUED | OUTPATIENT
Start: 2019-06-29 | End: 2019-07-01

## 2019-06-29 RX ORDER — MAGNESIUM HYDROXIDE 400 MG/1
30 TABLET, CHEWABLE ORAL
Refills: 0 | Status: DISCONTINUED | OUTPATIENT
Start: 2019-06-29 | End: 2019-07-01

## 2019-06-29 RX ORDER — SIMETHICONE 80 MG/1
80 TABLET, CHEWABLE ORAL EVERY 4 HOURS
Refills: 0 | Status: DISCONTINUED | OUTPATIENT
Start: 2019-06-29 | End: 2019-07-01

## 2019-06-29 RX ORDER — DOCUSATE SODIUM 100 MG
100 CAPSULE ORAL
Refills: 0 | Status: DISCONTINUED | OUTPATIENT
Start: 2019-06-29 | End: 2019-07-01

## 2019-06-29 RX ORDER — LANOLIN
1 OINTMENT (GRAM) TOPICAL EVERY 6 HOURS
Refills: 0 | Status: DISCONTINUED | OUTPATIENT
Start: 2019-06-29 | End: 2019-07-01

## 2019-06-29 RX ORDER — DIPHENHYDRAMINE HCL 50 MG
25 CAPSULE ORAL EVERY 6 HOURS
Refills: 0 | Status: DISCONTINUED | OUTPATIENT
Start: 2019-06-29 | End: 2019-07-01

## 2019-06-29 RX ORDER — BENZOCAINE 10 %
1 GEL (GRAM) MUCOUS MEMBRANE EVERY 6 HOURS
Refills: 0 | Status: DISCONTINUED | OUTPATIENT
Start: 2019-06-29 | End: 2019-07-01

## 2019-06-29 RX ORDER — HYDROCORTISONE 1 %
1 OINTMENT (GRAM) TOPICAL EVERY 6 HOURS
Refills: 0 | Status: DISCONTINUED | OUTPATIENT
Start: 2019-06-29 | End: 2019-07-01

## 2019-06-29 RX ORDER — OXYCODONE HYDROCHLORIDE 5 MG/1
5 TABLET ORAL
Refills: 0 | Status: DISCONTINUED | OUTPATIENT
Start: 2019-06-29 | End: 2019-07-01

## 2019-06-29 RX ORDER — PRAMOXINE HYDROCHLORIDE 150 MG/15G
1 AEROSOL, FOAM RECTAL EVERY 4 HOURS
Refills: 0 | Status: DISCONTINUED | OUTPATIENT
Start: 2019-06-29 | End: 2019-07-01

## 2019-06-29 RX ADMIN — FAMOTIDINE 20 MILLIGRAM(S): 10 INJECTION INTRAVENOUS at 08:15

## 2019-06-29 RX ADMIN — SODIUM CHLORIDE 125 MILLILITER(S): 9 INJECTION, SOLUTION INTRAVENOUS at 05:09

## 2019-06-29 RX ADMIN — Medication 30 MILLIGRAM(S): at 07:32

## 2019-06-29 RX ADMIN — Medication 2 MILLIUNIT(S)/MIN: at 05:09

## 2019-06-29 RX ADMIN — Medication 1000 MILLIUNIT(S)/MIN: at 05:09

## 2019-06-29 RX ADMIN — SODIUM CHLORIDE 3 MILLILITER(S): 9 INJECTION INTRAMUSCULAR; INTRAVENOUS; SUBCUTANEOUS at 22:00

## 2019-06-29 RX ADMIN — Medication 30 MILLIGRAM(S): at 08:00

## 2019-06-29 RX ADMIN — SIMETHICONE 80 MILLIGRAM(S): 80 TABLET, CHEWABLE ORAL at 09:40

## 2019-06-29 NOTE — OB PROVIDER DELIVERY SUMMARY - NSPROVIDERDELIVERYNOTE_OBGYN_ALL_OB_FT
uncomplicated , LOT position  small 1st deg lac, repaired   methergine IM x1 given due to atony  good hemostasis obtained

## 2019-06-29 NOTE — CHART NOTE - NSCHARTNOTEFT_GEN_A_CORE
PGY2 Progress Note    Subjective  Called to pt bedside for increased pain. Pt reexamined. SVE as below. Pt desires pain medication. Pt denies any other concerns.    Objective  T(C): 36.7 (06-28-19 @ 18:28), Max: 36.7 (06-28-19 @ 01:08)  HR: 96 (06-29-19 @ 00:14) (56 - 101)  BP: 119/91 (06-29-19 @ 00:07) (111/71 - 144/83)  RR: 20 (06-28-19 @ 14:06) (15 - 20)  SpO2: 99% (06-29-19 @ 00:19) (87% - 100%)  SVE: 8.5/90/-1  FHT: baseline 145, mod variability, +accels, -decels  [place]: q2-3min    Assessment  adequate contractions    Plan  anesthesia called for top off  continue pitocin    Discussed with attending physician, Dr. Smith.    Elvi Beaulieu MD PGY2

## 2019-06-29 NOTE — OB RN DELIVERY SUMMARY - NS_SEPSISRSKCALC_OBGYN_ALL_OB_FT
EOS calculated successfully. EOS Risk Factor: 0.5/1000 live births ([facility] national incidence); GA=37w1d; Temp=98.2; ROM=6; GBS='Unknown'; Antibiotics='GBS specific antibiotics > 2 hrs prior to birth'

## 2019-06-30 LAB — GP B STREP GENITAL QL CULT: SIGNIFICANT CHANGE UP

## 2019-06-30 RX ADMIN — SODIUM CHLORIDE 3 MILLILITER(S): 9 INJECTION INTRAMUSCULAR; INTRAVENOUS; SUBCUTANEOUS at 07:20

## 2019-06-30 RX ADMIN — Medication 600 MILLIGRAM(S): at 13:03

## 2019-06-30 RX ADMIN — SODIUM CHLORIDE 3 MILLILITER(S): 9 INJECTION INTRAMUSCULAR; INTRAVENOUS; SUBCUTANEOUS at 13:10

## 2019-06-30 RX ADMIN — Medication 325 MILLIGRAM(S): at 13:03

## 2019-06-30 RX ADMIN — Medication 100 MILLIGRAM(S): at 13:05

## 2019-06-30 RX ADMIN — Medication 600 MILLIGRAM(S): at 14:00

## 2019-06-30 RX ADMIN — Medication 1 TABLET(S): at 13:03

## 2019-07-01 VITALS
SYSTOLIC BLOOD PRESSURE: 124 MMHG | TEMPERATURE: 98 F | HEART RATE: 79 BPM | OXYGEN SATURATION: 99 % | RESPIRATION RATE: 18 BRPM | DIASTOLIC BLOOD PRESSURE: 67 MMHG

## 2019-07-01 RX ORDER — IBUPROFEN 200 MG
1 TABLET ORAL
Qty: 0 | Refills: 0 | DISCHARGE
Start: 2019-07-01

## 2019-07-01 RX ORDER — NORETHINDRONE 0.35 MG/1
1 TABLET ORAL
Qty: 30 | Refills: 3
Start: 2019-07-01 | End: 2019-10-28

## 2019-07-01 RX ORDER — ACETAMINOPHEN 500 MG
3 TABLET ORAL
Qty: 0 | Refills: 0 | DISCHARGE
Start: 2019-07-01

## 2019-07-01 RX ADMIN — Medication 975 MILLIGRAM(S): at 10:30

## 2019-07-01 RX ADMIN — Medication 975 MILLIGRAM(S): at 10:00

## 2019-07-01 RX ADMIN — SODIUM CHLORIDE 3 MILLILITER(S): 9 INJECTION INTRAMUSCULAR; INTRAVENOUS; SUBCUTANEOUS at 01:07

## 2019-07-01 NOTE — PROGRESS NOTE ADULT - PROBLEM SELECTOR PLAN 1
- Pain well controlled, continue current pain regimen  - Increase ambulation, SCDs when not ambulating  - Continue regular diet  - [place] for contraception  - Discharge planning   - Plan for circ today    Margaret Goel PGY-1
- Continue with po analgesia  - Increase ambulation  - Continue regular diet  - IV lock  - No labs    Kirit Bedoya, PGY-3  Pager #[phone] ([facility]), [phone] ([place])

## 2019-07-01 NOTE — PROGRESS NOTE ADULT - ASSESSMENT
24yo PPD#2 s/p  c/b PEC and A1.  Patient is stable and doing well post-partum.
25y/o  PPD#1 from [facility] in stable condition. Current issues include PEC, BPs wnl at this time.

## 2019-07-01 NOTE — PROGRESS NOTE ADULT - SUBJECTIVE AND OBJECTIVE BOX
OB Progress Note:  PPD#2    S: 22yo PPD#2 s/p  c/b PEC and A1. Patient feels well. Pain is well controlled. She is tolerating a regular diet and passing flatus. She is voiding spontaneously, and ambulating without difficulty. Denies CP/SOB. Denies lightheadedness/dizziness. Denies N/V. Denies heavy vaginal bleeding.    O:  Vitals:   Vital Signs Last 24 Hrs  T(C): 36.7 (2019 07:16), Max: 36.9 (2019 05:32)  T(F): 98.1 (2019 07:16), Max: 98.5 (2019 05:32)  HR: 79 (2019 07:16) (79 - 87)  BP: 124/67 (2019 07:16) (104/70 - 124/67)  BP(mean): --  RR: 18 (2019 07:16) (17 - 18)  SpO2: 99% (2019 07:16) (99% - 100%)    MEDICATIONS  (STANDING):  acetaminophen   Tablet .. 975 milliGRAM(s) Oral <User Schedule>  diphtheria/tetanus/pertussis (acellular) Vaccine (ADAcel) 0.5 milliLiter(s) IntraMuscular once  docusate sodium 100 milliGRAM(s) Oral daily  ferrous    sulfate 325 milliGRAM(s) Oral daily  ibuprofen  Tablet. 600 milliGRAM(s) Oral every 6 hours  prenatal multivitamin 1 Tablet(s) Oral daily  sodium chloride 0.9% lock flush 3 milliLiter(s) IV Push every 8 hours    MEDICATIONS  (PRN):  benzocaine 20%/menthol 0.5% Spray 1 Spray(s) Topical every 6 hours PRN for Perineal discomfort  dibucaine 1% Ointment 1 Application(s) Topical every 6 hours PRN Perineal discomfort  diphenhydrAMINE 25 milliGRAM(s) Oral every 6 hours PRN Pruritus  docusate sodium 100 milliGRAM(s) Oral two times a day PRN For stool softening  glycerin Suppository - Adult 1 Suppository(s) Rectal at bedtime PRN Constipation  hydrocortisone 1% Cream 1 Application(s) Topical every 6 hours PRN Moderate Pain (4-6)  lanolin Ointment 1 Application(s) Topical every 6 hours PRN nipple soreness  magnesium hydroxide Suspension 30 milliLiter(s) Oral two times a day PRN Constipation  oxyCODONE    IR 5 milliGRAM(s) Oral every 3 hours PRN Moderate to Severe Pain (4-10)  oxyCODONE    IR 5 milliGRAM(s) Oral once PRN Moderate to Severe Pain (4-10)  pramoxine 1%/zinc 5% Cream 1 Application(s) Topical every 4 hours PRN Moderate Pain (4-6)  simethicone 80 milliGRAM(s) Chew every 4 hours PRN Gas  witch hazel Pads 1 Application(s) Topical every 4 hours PRN Perineal discomfort      Labs:  Blood type: B Positive  Rubella IgG: Positive ( @ 23:00)  RPR: Negative                          8.7<L>   17.72<H> >-----------< 341    (  @ 08:18 )             27.0<L>                        10.9<L>   11.78<H> >-----------< 417<H>    (  @ 17:20 )             35.8                        11.0<L>   11.28<H> >-----------< 442<H>    (  @ 09:26 )             34.9    19 @ 08:18      136  |  108<H>  |  10  ----------------------------<  109<H>  4.1   |  19<L>  |  0.73    19 @ 17:20      139  |  108<H>  |  8   ----------------------------<  79  4.4   |  21<L>  |  0.51    19 @ 09:26      136  |  105  |  10  ----------------------------<  104<H>  4.2   |  18<L>  |  0.48<L>        Ca    9.2      2019 08:18  Ca    9.6      2019 17:20  Ca    9.8      2019 09:26    TPro  5.6<L>  /  Alb  2.4<L>  /  TBili  < 0.2<L>  /  DBili  x   /  AST  24  /  ALT  16  /  AlkPhos  112  19 @ 08:18  TPro  7.0  /  Alb  3.1<L>  /  TBili  < 0.2<L>  /  DBili  x   /  AST  17  /  ALT  18  /  AlkPhos  146<H>  19 @ 17:20  TPro  7.2  /  Alb  3.1<L>  /  TBili  < 0.2<L>  /  DBili  x   /  AST  18  /  ALT  18  /  AlkPhos  149<H>  19 @ 09:26      Physical Exam:  General: NAD  Abdomen: soft, non-tender, non-distended, fundus firm  Vaginal: No heavy vaginal bleeding  Extremities: No erythema/edema
Patient seen and examined at bedside, no acute overnight events. No acute complaints, pain well controlled. Patient is ambulating, voiding spontaneously, passing gas, and tolerating regular diet. Denies CP, SOB, N/V, HA, blurred vision, epigastric pain.    Vital Signs Last 24 Hours  T(C): 36.9 (06-30-19 @ 05:56), Max: 37.1 (06-29-19 @ 18:50)  HR: 89 (06-30-19 @ 05:56) (58 - 98)  BP: 106/65 (06-30-19 @ 05:56) (106/65 - 133/81)  RR: 16 (06-30-19 @ 05:56) (16 - 18)  SpO2: 99% (06-30-19 @ 05:56) (99% - 100%)    Physical Exam:  General: NAD  Abdomen: Soft, non-tender, non-distended, fundus firm  Pelvic: Lochia wnl    Labs:    Blood Type: B Positive  Antibody Screen: --  Rubella IgG: Positive (Positive=Immune, Negative=Non-Immune)  RPR: Negative               8.7    17.72 )-----------( 341      ( 06-29 @ 08:18 )             27.0                10.9   11.78 )-----------( 417      ( 06-28 @ 17:20 )             35.8                11.0   11.28 )-----------( 442      ( 06-28 @ 09:26 )             34.9         MEDICATIONS  (STANDING):  acetaminophen   Tablet .. 975 milliGRAM(s) Oral <User Schedule>  dextrose 5% + lactated ringers. 1000 milliLiter(s) (125 mL/Hr) IV Continuous <Continuous>  diphtheria/tetanus/pertussis (acellular) Vaccine (ADAcel) 0.5 milliLiter(s) IntraMuscular once  docusate sodium 100 milliGRAM(s) Oral daily  ferrous    sulfate 325 milliGRAM(s) Oral daily  ibuprofen  Tablet. 600 milliGRAM(s) Oral every 6 hours  lactated ringers. 1000 milliLiter(s) (125 mL/Hr) IV Continuous <Continuous>  ondansetron Injectable 4 milliGRAM(s) IV Push once  oxytocin Infusion 333.333 milliUNIT(s)/Min (1000 mL/Hr) IV Continuous <Continuous>  oxytocin Infusion 2 milliUNIT(s)/Min (2 mL/Hr) IV Continuous <Continuous>  oxytocin Infusion 333.333 milliUNIT(s)/Min (1000 mL/Hr) IV Continuous <Continuous>  prenatal multivitamin 1 Tablet(s) Oral daily  sodium chloride 0.9% lock flush 3 milliLiter(s) IV Push every 8 hours    MEDICATIONS  (PRN):  benzocaine 20%/menthol 0.5% Spray 1 Spray(s) Topical every 6 hours PRN for Perineal discomfort  dibucaine 1% Ointment 1 Application(s) Topical every 6 hours PRN Perineal discomfort  diphenhydrAMINE 25 milliGRAM(s) Oral every 6 hours PRN Pruritus  docusate sodium 100 milliGRAM(s) Oral two times a day PRN For stool softening  glycerin Suppository - Adult 1 Suppository(s) Rectal at bedtime PRN Constipation  hydrocortisone 1% Cream 1 Application(s) Topical every 6 hours PRN Moderate Pain (4-6)  lanolin Ointment 1 Application(s) Topical every 6 hours PRN nipple soreness  magnesium hydroxide Suspension 30 milliLiter(s) Oral two times a day PRN Constipation  oxyCODONE    IR 5 milliGRAM(s) Oral every 3 hours PRN Moderate to Severe Pain (4-10)  oxyCODONE    IR 5 milliGRAM(s) Oral once PRN Moderate to Severe Pain (4-10)  pramoxine 1%/zinc 5% Cream 1 Application(s) Topical every 4 hours PRN Moderate Pain (4-6)  simethicone 80 milliGRAM(s) Chew every 4 hours PRN Gas  witch hazel Pads 1 Application(s) Topical every 4 hours PRN Perineal discomfort

## 2019-07-10 DIAGNOSIS — O99.013 ANEMIA COMPLICATING PREGNANCY, THIRD TRIMESTER: ICD-10-CM

## 2019-07-10 DIAGNOSIS — O24.410 GESTATIONAL DIABETES MELLITUS IN PREGNANCY, DIET CONTROLLED: ICD-10-CM

## 2019-07-10 DIAGNOSIS — O10.013 PRE-EXISTING ESSENTIAL HYPERTENSION COMPLICATING PREGNANCY, THIRD TRIMESTER: ICD-10-CM

## 2019-07-10 DIAGNOSIS — Z3A.37 37 WEEKS GESTATION OF PREGNANCY: ICD-10-CM

## 2020-10-29 ENCOUNTER — LABORATORY RESULT (OUTPATIENT)
Age: 25
End: 2020-10-29

## 2020-10-29 ENCOUNTER — NON-APPOINTMENT (OUTPATIENT)
Age: 25
End: 2020-10-29

## 2020-10-29 ENCOUNTER — RESULT REVIEW (OUTPATIENT)
Age: 25
End: 2020-10-29

## 2020-10-29 ENCOUNTER — APPOINTMENT (OUTPATIENT)
Dept: OBGYN | Facility: HOSPITAL | Age: 25
End: 2020-10-29

## 2020-10-29 ENCOUNTER — MED ADMIN CHARGE (OUTPATIENT)
Age: 25
End: 2020-10-29

## 2020-10-29 ENCOUNTER — OUTPATIENT (OUTPATIENT)
Dept: OUTPATIENT SERVICES | Facility: HOSPITAL | Age: 25
LOS: 1 days | End: 2020-10-29
Payer: MEDICAID

## 2020-10-29 VITALS
BODY MASS INDEX: 29.44 KG/M2 | HEART RATE: 105 BPM | WEIGHT: 160 LBS | SYSTOLIC BLOOD PRESSURE: 110 MMHG | HEIGHT: 62 IN | DIASTOLIC BLOOD PRESSURE: 66 MMHG | TEMPERATURE: 98.1 F

## 2020-10-29 DIAGNOSIS — Z82.5 FAMILY HISTORY OF ASTHMA AND OTHER CHRONIC LOWER RESPIRATORY DISEASES: ICD-10-CM

## 2020-10-29 DIAGNOSIS — Z82.49 FAMILY HISTORY OF ISCHEMIC HEART DISEASE AND OTHER DISEASES OF THE CIRCULATORY SYSTEM: ICD-10-CM

## 2020-10-29 DIAGNOSIS — Z23 ENCOUNTER FOR IMMUNIZATION: ICD-10-CM

## 2020-10-29 DIAGNOSIS — Z87.59 PERSONAL HISTORY OF OTHER COMPLICATIONS OF PREGNANCY, CHILDBIRTH AND THE PUERPERIUM: ICD-10-CM

## 2020-10-29 DIAGNOSIS — Z86.32 PERSONAL HISTORY OF GESTATIONAL DIABETES: ICD-10-CM

## 2020-10-29 DIAGNOSIS — Z83.3 FAMILY HISTORY OF DIABETES MELLITUS: ICD-10-CM

## 2020-10-29 DIAGNOSIS — Z83.42 FAMILY HISTORY OF FAMILIAL HYPERCHOLESTEROLEMIA: ICD-10-CM

## 2020-10-29 DIAGNOSIS — Z84.2 FAMILY HISTORY OF OTHER DISEASES OF THE GENITOURINARY SYSTEM: ICD-10-CM

## 2020-10-29 LAB
24R-OH-CALCIDIOL SERPL-MCNC: 14.6 NG/ML — LOW (ref 30–80)
ALBUMIN SERPL ELPH-MCNC: 3.6 G/DL — SIGNIFICANT CHANGE UP (ref 3.3–5)
ALP SERPL-CCNC: 86 U/L — SIGNIFICANT CHANGE UP (ref 40–120)
ALT FLD-CCNC: 8 U/L — SIGNIFICANT CHANGE UP (ref 4–33)
ANION GAP SERPL CALC-SCNC: 11 MMO/L — SIGNIFICANT CHANGE UP (ref 7–14)
APPEARANCE UR: CLEAR — SIGNIFICANT CHANGE UP
AST SERPL-CCNC: 13 U/L — SIGNIFICANT CHANGE UP (ref 4–32)
BACTERIA # UR AUTO: SIGNIFICANT CHANGE UP
BILIRUB SERPL-MCNC: < 0.2 MG/DL — LOW (ref 0.2–1.2)
BILIRUB UR-MCNC: NEGATIVE — SIGNIFICANT CHANGE UP
BLD GP AB SCN SERPL QL: NEGATIVE — SIGNIFICANT CHANGE UP
BLOOD UR QL VISUAL: NEGATIVE — SIGNIFICANT CHANGE UP
BUN SERPL-MCNC: 6 MG/DL — LOW (ref 7–23)
CALCIUM SERPL-MCNC: 9.1 MG/DL — SIGNIFICANT CHANGE UP (ref 8.4–10.5)
CHLORIDE SERPL-SCNC: 104 MMOL/L — SIGNIFICANT CHANGE UP (ref 98–107)
CO2 SERPL-SCNC: 21 MMOL/L — LOW (ref 22–31)
COLOR SPEC: YELLOW — SIGNIFICANT CHANGE UP
CREAT SERPL-MCNC: 0.29 MG/DL — LOW (ref 0.5–1.3)
GLUCOSE BLDC GLUCOMTR-MCNC: 117
GLUCOSE SERPL-MCNC: 90 MG/DL — SIGNIFICANT CHANGE UP (ref 70–99)
GLUCOSE UR-MCNC: NEGATIVE — SIGNIFICANT CHANGE UP
HBA1C BLD-MCNC: 5.8 % — HIGH (ref 4–5.6)
HYALINE CASTS # UR AUTO: NEGATIVE — SIGNIFICANT CHANGE UP
KETONES UR-MCNC: NEGATIVE — SIGNIFICANT CHANGE UP
LEUKOCYTE ESTERASE UR-ACNC: SIGNIFICANT CHANGE UP
MUCOUS THREADS # UR AUTO: SIGNIFICANT CHANGE UP
NITRITE UR-MCNC: POSITIVE — HIGH
PH UR: 6.5 — SIGNIFICANT CHANGE UP (ref 5–8)
POTASSIUM SERPL-MCNC: 3.7 MMOL/L — SIGNIFICANT CHANGE UP (ref 3.5–5.3)
POTASSIUM SERPL-SCNC: 3.7 MMOL/L — SIGNIFICANT CHANGE UP (ref 3.5–5.3)
PROT SERPL-MCNC: 7 G/DL — SIGNIFICANT CHANGE UP (ref 6–8.3)
PROT UR-MCNC: 20 — SIGNIFICANT CHANGE UP
RBC CASTS # UR COMP ASSIST: SIGNIFICANT CHANGE UP (ref 0–?)
RH IG SCN BLD-IMP: POSITIVE — SIGNIFICANT CHANGE UP
SODIUM SERPL-SCNC: 136 MMOL/L — SIGNIFICANT CHANGE UP (ref 135–145)
SP GR SPEC: 1.02 — SIGNIFICANT CHANGE UP (ref 1–1.04)
SQUAMOUS # UR AUTO: SIGNIFICANT CHANGE UP
UROBILINOGEN FLD QL: NORMAL — SIGNIFICANT CHANGE UP
WBC UR QL: HIGH (ref 0–?)

## 2020-10-29 PROCEDURE — G0452: CPT

## 2020-10-29 RX ORDER — PRENATAL VIT 27,CALC/IRON/FA 60 MG-1 MG
60-1 TABLET ORAL DAILY
Qty: 100 | Refills: 5 | Status: COMPLETED | OUTPATIENT
Start: 2020-10-29 | End: 2021-04-27

## 2020-10-30 DIAGNOSIS — Z00.00 ENCOUNTER FOR GENERAL ADULT MEDICAL EXAMINATION WITHOUT ABNORMAL FINDINGS: ICD-10-CM

## 2020-10-30 DIAGNOSIS — Z23 ENCOUNTER FOR IMMUNIZATION: ICD-10-CM

## 2020-10-30 DIAGNOSIS — B37.3 CANDIDIASIS OF VULVA AND VAGINA: ICD-10-CM

## 2020-10-30 DIAGNOSIS — Z34.80 ENCOUNTER FOR SUPERVISION OF OTHER NORMAL PREGNANCY, UNSPECIFIED TRIMESTER: ICD-10-CM

## 2020-10-30 LAB
C TRACH RRNA SPEC QL NAA+PROBE: SIGNIFICANT CHANGE UP
C TRACH+GC RRNA SPEC QL PROBE: SIGNIFICANT CHANGE UP
MEV IGG SER-ACNC: 180 AU/ML — SIGNIFICANT CHANGE UP
MEV IGG+IGM SER-IMP: POSITIVE — SIGNIFICANT CHANGE UP
N GONORRHOEA RRNA SPEC QL NAA+PROBE: SIGNIFICANT CHANGE UP
RUBV IGG SER-ACNC: 3.7 INDEX — SIGNIFICANT CHANGE UP
RUBV IGG SER-IMP: POSITIVE — SIGNIFICANT CHANGE UP
SARS-COV-2 IGG SERPL QL IA: POSITIVE
SARS-COV-2 IGM SERPL IA-ACNC: 29.3 INDEX — HIGH
VZV IGG FLD QL IA: 1367 INDEX — SIGNIFICANT CHANGE UP
VZV IGG FLD QL IA: POSITIVE — SIGNIFICANT CHANGE UP

## 2020-10-31 LAB
GAMMA INTERFERON BACKGROUND BLD IA-ACNC: 0.03 IU/ML — SIGNIFICANT CHANGE UP
M TB IFN-G BLD-IMP: HIGH
M TB IFN-G CD4+ BCKGRND COR BLD-ACNC: 0 IU/ML — SIGNIFICANT CHANGE UP
M TB IFN-G CD4+CD8+ BCKGRND COR BLD-ACNC: 0 IU/ML — SIGNIFICANT CHANGE UP
QUANT TB PLUS MITOGEN MINUS NIL: 0.19 IU/ML — SIGNIFICANT CHANGE UP

## 2020-11-01 LAB
-  AMPICILLIN/SULBACTAM: SIGNIFICANT CHANGE UP
-  CEFAZOLIN: SIGNIFICANT CHANGE UP
-  GENTAMICIN: SIGNIFICANT CHANGE UP
-  OXACILLIN: SIGNIFICANT CHANGE UP
-  PENICILLIN: SIGNIFICANT CHANGE UP
-  RIFAMPIN: SIGNIFICANT CHANGE UP
-  TETRACYCLINE: SIGNIFICANT CHANGE UP
-  TRIMETHOPRIM/SULFAMETHOXAZOLE: SIGNIFICANT CHANGE UP
-  VANCOMYCIN: SIGNIFICANT CHANGE UP
CULTURE RESULTS: SIGNIFICANT CHANGE UP
METHOD TYPE: SIGNIFICANT CHANGE UP
ORGANISM # SPEC MICROSCOPIC CNT: SIGNIFICANT CHANGE UP
ORGANISM # SPEC MICROSCOPIC CNT: SIGNIFICANT CHANGE UP
SPECIMEN SOURCE: SIGNIFICANT CHANGE UP

## 2020-11-03 LAB
CYTOLOGY SPEC DOC CYTO: SIGNIFICANT CHANGE UP
HGB A MFR BLD: 96.9 % — SIGNIFICANT CHANGE UP
HGB A2 MFR BLD: 2.8 % — SIGNIFICANT CHANGE UP (ref 2.4–3.5)
HGB ELECT COMMENT: SIGNIFICANT CHANGE UP
HGB F MFR BLD: < 1 % — SIGNIFICANT CHANGE UP (ref 0–1.5)

## 2020-11-05 ENCOUNTER — APPOINTMENT (OUTPATIENT)
Dept: ANTEPARTUM | Facility: CLINIC | Age: 25
End: 2020-11-05
Payer: MEDICAID

## 2020-11-05 ENCOUNTER — ASOB RESULT (OUTPATIENT)
Age: 25
End: 2020-11-05

## 2020-11-05 PROCEDURE — 76811 OB US DETAILED SNGL FETUS: CPT | Mod: 26

## 2020-11-06 LAB — CFTR MUT ANL BLD/T: NEGATIVE — SIGNIFICANT CHANGE UP

## 2020-12-01 ENCOUNTER — APPOINTMENT (OUTPATIENT)
Dept: OBGYN | Facility: HOSPITAL | Age: 25
End: 2020-12-01

## 2020-12-01 ENCOUNTER — LABORATORY RESULT (OUTPATIENT)
Age: 25
End: 2020-12-01

## 2020-12-01 ENCOUNTER — RESULT REVIEW (OUTPATIENT)
Age: 25
End: 2020-12-01

## 2020-12-01 ENCOUNTER — NON-APPOINTMENT (OUTPATIENT)
Age: 25
End: 2020-12-01

## 2020-12-01 ENCOUNTER — APPOINTMENT (OUTPATIENT)
Dept: RADIOLOGY | Facility: HOSPITAL | Age: 25
End: 2020-12-01

## 2020-12-01 ENCOUNTER — MED ADMIN CHARGE (OUTPATIENT)
Age: 25
End: 2020-12-01

## 2020-12-01 ENCOUNTER — OUTPATIENT (OUTPATIENT)
Dept: OUTPATIENT SERVICES | Facility: HOSPITAL | Age: 25
LOS: 1 days | End: 2020-12-01
Payer: MEDICAID

## 2020-12-01 VITALS
WEIGHT: 163 LBS | SYSTOLIC BLOOD PRESSURE: 108 MMHG | TEMPERATURE: 97.9 F | HEART RATE: 98 BPM | DIASTOLIC BLOOD PRESSURE: 64 MMHG

## 2020-12-01 DIAGNOSIS — R76.12 NONSPECIFIC REACTION TO CELL MEDIATED IMMUNITY MEASUREMENT OF GAMMA INTERFERON ANTIGEN RESPONSE WITHOUT ACTIVE TUBERCULOSIS: ICD-10-CM

## 2020-12-01 DIAGNOSIS — B37.3 CANDIDIASIS OF VULVA AND VAGINA: ICD-10-CM

## 2020-12-01 DIAGNOSIS — O23.40 UNSPECIFIED INFECTION OF URINARY TRACT IN PREGNANCY, UNSPECIFIED TRIMESTER: ICD-10-CM

## 2020-12-01 DIAGNOSIS — Z23 ENCOUNTER FOR IMMUNIZATION: ICD-10-CM

## 2020-12-01 DIAGNOSIS — Z34.80 ENCOUNTER FOR SUPERVISION OF OTHER NORMAL PREGNANCY, UNSPECIFIED TRIMESTER: ICD-10-CM

## 2020-12-01 DIAGNOSIS — R79.89 OTHER SPECIFIED ABNORMAL FINDINGS OF BLOOD CHEMISTRY: ICD-10-CM

## 2020-12-01 DIAGNOSIS — Z00.00 ENCOUNTER FOR GENERAL ADULT MEDICAL EXAMINATION W/OUT ABNORMAL FINDINGS: ICD-10-CM

## 2020-12-01 LAB
24R-OH-CALCIDIOL SERPL-MCNC: 12.5 NG/ML — LOW (ref 30–80)
BASOPHILS # BLD AUTO: 0.03 K/UL — SIGNIFICANT CHANGE UP (ref 0–0.2)
BASOPHILS NFR BLD AUTO: 0.3 % — SIGNIFICANT CHANGE UP (ref 0–2)
EOSINOPHIL # BLD AUTO: 0.1 K/UL — SIGNIFICANT CHANGE UP (ref 0–0.5)
EOSINOPHIL NFR BLD AUTO: 1 % — SIGNIFICANT CHANGE UP (ref 0–6)
GLUCOSE PRE/P GLC SERPL-SCNC: 163 — HIGH (ref 65–115)
HCT VFR BLD CALC: 31 % — LOW (ref 34.5–45)
HGB BLD-MCNC: 9.3 G/DL — LOW (ref 11.5–15.5)
IMM GRANULOCYTES NFR BLD AUTO: 3 % — HIGH (ref 0–1.5)
LYMPHOCYTES # BLD AUTO: 1.93 K/UL — SIGNIFICANT CHANGE UP (ref 1–3.3)
LYMPHOCYTES # BLD AUTO: 18.7 % — SIGNIFICANT CHANGE UP (ref 13–44)
MCHC RBC-ENTMCNC: 24 PG — LOW (ref 27–34)
MCHC RBC-ENTMCNC: 30 % — LOW (ref 32–36)
MCV RBC AUTO: 79.9 FL — LOW (ref 80–100)
MONOCYTES # BLD AUTO: 0.77 K/UL — SIGNIFICANT CHANGE UP (ref 0–0.9)
MONOCYTES NFR BLD AUTO: 7.5 % — SIGNIFICANT CHANGE UP (ref 2–14)
NEUTROPHILS # BLD AUTO: 7.16 K/UL — SIGNIFICANT CHANGE UP (ref 1.8–7.4)
NEUTROPHILS NFR BLD AUTO: 69.5 % — SIGNIFICANT CHANGE UP (ref 43–77)
NRBC # FLD: 0 K/UL — SIGNIFICANT CHANGE UP (ref 0–0)
PLATELET # BLD AUTO: 405 K/UL — HIGH (ref 150–400)
PMV BLD: 9 FL — SIGNIFICANT CHANGE UP (ref 7–13)
RBC # BLD: 3.88 M/UL — SIGNIFICANT CHANGE UP (ref 3.8–5.2)
RBC # FLD: 19.5 % — HIGH (ref 10.3–14.5)
WBC # BLD: 10.3 K/UL — SIGNIFICANT CHANGE UP (ref 3.8–10.5)
WBC # FLD AUTO: 10.3 K/UL — SIGNIFICANT CHANGE UP (ref 3.8–10.5)

## 2020-12-01 PROCEDURE — 71045 X-RAY EXAM CHEST 1 VIEW: CPT | Mod: 26

## 2020-12-02 LAB
CULTURE RESULTS: SIGNIFICANT CHANGE UP
SPECIMEN SOURCE: SIGNIFICANT CHANGE UP
T PALLIDUM AB TITR SER: NEGATIVE — SIGNIFICANT CHANGE UP

## 2020-12-04 ENCOUNTER — APPOINTMENT (OUTPATIENT)
Dept: RADIOLOGY | Facility: HOSPITAL | Age: 25
End: 2020-12-04

## 2020-12-04 ENCOUNTER — LABORATORY RESULT (OUTPATIENT)
Age: 25
End: 2020-12-04

## 2020-12-04 ENCOUNTER — OUTPATIENT (OUTPATIENT)
Dept: OUTPATIENT SERVICES | Facility: HOSPITAL | Age: 25
LOS: 1 days | End: 2020-12-04
Payer: MEDICAID

## 2020-12-04 ENCOUNTER — APPOINTMENT (OUTPATIENT)
Dept: OBGYN | Facility: HOSPITAL | Age: 25
End: 2020-12-04

## 2020-12-04 ENCOUNTER — RESULT REVIEW (OUTPATIENT)
Age: 25
End: 2020-12-04

## 2020-12-04 LAB
GLUCOSE 1H P CHAL SERPL-SCNC: 187 MG/DL — HIGH (ref 120–170)
GLUCOSE 2H P GLC SERPL-MCNC: 151 MG/DL — HIGH (ref 70–120)
GLUCOSE 3H P CHAL SERPL-SCNC: 124 MG/DL — HIGH (ref 70–115)
GLUCOSE P FAST SERPL-MCNC: 87 MG/DL — SIGNIFICANT CHANGE UP (ref 70–108)

## 2020-12-04 PROCEDURE — 71046 X-RAY EXAM CHEST 2 VIEWS: CPT | Mod: 26

## 2020-12-07 ENCOUNTER — NON-APPOINTMENT (OUTPATIENT)
Age: 25
End: 2020-12-07

## 2020-12-08 DIAGNOSIS — Z34.92 ENCOUNTER FOR SUPERVISION OF NORMAL PREGNANCY, UNSPECIFIED, SECOND TRIMESTER: ICD-10-CM

## 2020-12-10 ENCOUNTER — NON-APPOINTMENT (OUTPATIENT)
Age: 25
End: 2020-12-10

## 2020-12-28 ENCOUNTER — APPOINTMENT (OUTPATIENT)
Dept: OBGYN | Facility: HOSPITAL | Age: 25
End: 2020-12-28

## 2020-12-28 ENCOUNTER — NON-APPOINTMENT (OUTPATIENT)
Age: 25
End: 2020-12-28

## 2020-12-28 ENCOUNTER — APPOINTMENT (OUTPATIENT)
Dept: ANTEPARTUM | Facility: CLINIC | Age: 25
End: 2020-12-28
Payer: MEDICAID

## 2020-12-28 ENCOUNTER — RESULT REVIEW (OUTPATIENT)
Age: 25
End: 2020-12-28

## 2020-12-28 ENCOUNTER — OUTPATIENT (OUTPATIENT)
Dept: OUTPATIENT SERVICES | Facility: HOSPITAL | Age: 25
LOS: 1 days | End: 2020-12-28

## 2020-12-28 ENCOUNTER — ASOB RESULT (OUTPATIENT)
Age: 25
End: 2020-12-28

## 2020-12-28 VITALS — TEMPERATURE: 98.5 F

## 2020-12-28 VITALS — WEIGHT: 165 LBS | DIASTOLIC BLOOD PRESSURE: 56 MMHG | HEART RATE: 100 BPM | SYSTOLIC BLOOD PRESSURE: 92 MMHG

## 2020-12-28 DIAGNOSIS — Z86.11 PERSONAL HISTORY OF TUBERCULOSIS: ICD-10-CM

## 2020-12-28 DIAGNOSIS — O99.013 ANEMIA COMPLICATING PREGNANCY, THIRD TRIMESTER: ICD-10-CM

## 2020-12-28 LAB
HCT VFR BLD CALC: 29.6 % — LOW (ref 34.5–45)
HGB BLD-MCNC: 8.8 G/DL — LOW (ref 11.5–15.5)
MCHC RBC-ENTMCNC: 23.3 PG — LOW (ref 27–34)
MCHC RBC-ENTMCNC: 29.7 GM/DL — LOW (ref 32–36)
MCV RBC AUTO: 78.3 FL — LOW (ref 80–100)
NRBC # BLD: 0 /100 WBCS — SIGNIFICANT CHANGE UP
NRBC # FLD: 0 K/UL — SIGNIFICANT CHANGE UP
PLATELET # BLD AUTO: 379 K/UL — SIGNIFICANT CHANGE UP (ref 150–400)
RBC # BLD: 3.78 M/UL — LOW (ref 3.8–5.2)
RBC # FLD: 18.1 % — HIGH (ref 10.3–14.5)
T4 FREE SERPL-MCNC: 1 NG/DL — SIGNIFICANT CHANGE UP (ref 0.9–1.8)
TSH SERPL-MCNC: 0.95 UIU/ML — SIGNIFICANT CHANGE UP (ref 0.27–4.2)
WBC # BLD: 11.1 K/UL — HIGH (ref 3.8–10.5)
WBC # FLD AUTO: 11.1 K/UL — HIGH (ref 3.8–10.5)

## 2020-12-28 PROCEDURE — 76816 OB US FOLLOW-UP PER FETUS: CPT | Mod: 26

## 2020-12-28 PROCEDURE — 76819 FETAL BIOPHYS PROFIL W/O NST: CPT | Mod: 26

## 2020-12-29 DIAGNOSIS — O99.013 ANEMIA COMPLICATING PREGNANCY, THIRD TRIMESTER: ICD-10-CM

## 2020-12-29 DIAGNOSIS — Z34.80 ENCOUNTER FOR SUPERVISION OF OTHER NORMAL PREGNANCY, UNSPECIFIED TRIMESTER: ICD-10-CM

## 2020-12-29 LAB
ANISOCYTOSIS BLD QL: SLIGHT — SIGNIFICANT CHANGE UP
BASOPHILS NFR BLD AUTO: 0 % — SIGNIFICANT CHANGE UP (ref 0–2)
BLASTS # FLD: 0 % — SIGNIFICANT CHANGE UP (ref 0–0)
EOSINOPHIL NFR BLD AUTO: 1 % — SIGNIFICANT CHANGE UP (ref 0–6)
HYPOCHROMIA BLD QL: SLIGHT — SIGNIFICANT CHANGE UP
LG PLATELETS BLD QL AUTO: SLIGHT — SIGNIFICANT CHANGE UP
LYMPHOCYTES # BLD AUTO: 8 % — LOW (ref 13–44)
LYMPHOCYTES # SPEC AUTO: 0 % — SIGNIFICANT CHANGE UP (ref 0–0)
METAMYELOCYTES # FLD: 1 % — SIGNIFICANT CHANGE UP (ref 0–1)
MICROCYTES BLD QL: SLIGHT — SIGNIFICANT CHANGE UP
MONOCYTES NFR BLD AUTO: 5 % — SIGNIFICANT CHANGE UP (ref 2–14)
MYELOCYTES NFR BLD: 2 % — HIGH (ref 0–0)
NEUTROPHILS NFR BLD AUTO: 80 % — HIGH (ref 43–77)
NEUTS BAND # BLD: 0 % — SIGNIFICANT CHANGE UP (ref 0–6)
NRBC # BLD: 0 /100 — SIGNIFICANT CHANGE UP (ref 0–0)
PLAT MORPH BLD: ABNORMAL
PLATELET CLUMP BLD QL SMEAR: ABNORMAL
PLATELET COUNT - ESTIMATE: NORMAL — SIGNIFICANT CHANGE UP
POIKILOCYTOSIS BLD QL AUTO: SLIGHT — SIGNIFICANT CHANGE UP
POLYCHROMASIA BLD QL SMEAR: SLIGHT — SIGNIFICANT CHANGE UP
PROMYELOCYTES # FLD: 0 % — SIGNIFICANT CHANGE UP (ref 0–0)
RBC BLD AUTO: ABNORMAL
SMUDGE CELLS # BLD: PRESENT — SIGNIFICANT CHANGE UP
VARIANT LYMPHS # BLD: 3 % — SIGNIFICANT CHANGE UP (ref 0–6)

## 2020-12-30 ENCOUNTER — NON-APPOINTMENT (OUTPATIENT)
Age: 25
End: 2020-12-30

## 2021-01-08 ENCOUNTER — APPOINTMENT (OUTPATIENT)
Dept: INFECTIOUS DISEASE | Facility: CLINIC | Age: 26
End: 2021-01-08

## 2021-01-11 ENCOUNTER — NON-APPOINTMENT (OUTPATIENT)
Age: 26
End: 2021-01-11

## 2021-01-11 ENCOUNTER — APPOINTMENT (OUTPATIENT)
Dept: OBGYN | Facility: HOSPITAL | Age: 26
End: 2021-01-11

## 2021-01-11 ENCOUNTER — RESULT REVIEW (OUTPATIENT)
Age: 26
End: 2021-01-11

## 2021-01-11 ENCOUNTER — TRANSCRIPTION ENCOUNTER (OUTPATIENT)
Age: 26
End: 2021-01-11

## 2021-01-11 ENCOUNTER — OUTPATIENT (OUTPATIENT)
Dept: OUTPATIENT SERVICES | Facility: HOSPITAL | Age: 26
LOS: 1 days | End: 2021-01-11

## 2021-01-11 VITALS — SYSTOLIC BLOOD PRESSURE: 114 MMHG | WEIGHT: 167 LBS | HEART RATE: 97 BPM | DIASTOLIC BLOOD PRESSURE: 63 MMHG

## 2021-01-11 DIAGNOSIS — R93.89 ABNORMAL FINDINGS ON DIAGNOSTIC IMAGING OF OTHER SPECIFIED BODY STRUCTURES: ICD-10-CM

## 2021-01-11 DIAGNOSIS — R76.12 NONSPECIFIC REACTION TO CELL MEDIATED IMMUNITY MEASUREMENT OF GAMMA INTERFERON ANTIGEN RESPONSE W/OUT ACTIVE TUBERCULOSIS: ICD-10-CM

## 2021-01-11 LAB
BASOPHILS # BLD AUTO: 0.05 K/UL — SIGNIFICANT CHANGE UP (ref 0–0.2)
BASOPHILS NFR BLD AUTO: 0.5 % — SIGNIFICANT CHANGE UP (ref 0–2)
EOSINOPHIL # BLD AUTO: 0.19 K/UL — SIGNIFICANT CHANGE UP (ref 0–0.5)
EOSINOPHIL NFR BLD AUTO: 1.8 % — SIGNIFICANT CHANGE UP (ref 0–6)
HCT VFR BLD CALC: 31.1 % — LOW (ref 34.5–45)
HGB BLD-MCNC: 9.2 G/DL — LOW (ref 11.5–15.5)
IANC: 7.16 K/UL — SIGNIFICANT CHANGE UP (ref 1.5–8.5)
IMM GRANULOCYTES NFR BLD AUTO: 4.1 % — HIGH (ref 0–1.5)
LYMPHOCYTES # BLD AUTO: 1.8 K/UL — SIGNIFICANT CHANGE UP (ref 1–3.3)
LYMPHOCYTES # BLD AUTO: 16.6 % — SIGNIFICANT CHANGE UP (ref 13–44)
MCHC RBC-ENTMCNC: 23.7 PG — LOW (ref 27–34)
MCHC RBC-ENTMCNC: 29.6 GM/DL — LOW (ref 32–36)
MCV RBC AUTO: 79.9 FL — LOW (ref 80–100)
MONOCYTES # BLD AUTO: 1.19 K/UL — HIGH (ref 0–0.9)
MONOCYTES NFR BLD AUTO: 11 % — SIGNIFICANT CHANGE UP (ref 2–14)
NEUTROPHILS # BLD AUTO: 7.16 K/UL — SIGNIFICANT CHANGE UP (ref 1.8–7.4)
NEUTROPHILS NFR BLD AUTO: 66 % — SIGNIFICANT CHANGE UP (ref 43–77)
NRBC # BLD: 0 /100 WBCS — SIGNIFICANT CHANGE UP
NRBC # FLD: 0 K/UL — SIGNIFICANT CHANGE UP
PLATELET # BLD AUTO: 428 K/UL — HIGH (ref 150–400)
RBC # BLD: 3.89 M/UL — SIGNIFICANT CHANGE UP (ref 3.8–5.2)
RBC # FLD: 18.6 % — HIGH (ref 10.3–14.5)
WBC # BLD: 10.83 K/UL — HIGH (ref 3.8–10.5)
WBC # FLD AUTO: 10.83 K/UL — HIGH (ref 3.8–10.5)

## 2021-01-13 DIAGNOSIS — Z34.80 ENCOUNTER FOR SUPERVISION OF OTHER NORMAL PREGNANCY, UNSPECIFIED TRIMESTER: ICD-10-CM

## 2021-01-13 DIAGNOSIS — R93.89 ABNORMAL FINDINGS ON DIAGNOSTIC IMAGING OF OTHER SPECIFIED BODY STRUCTURES: ICD-10-CM

## 2021-01-26 ENCOUNTER — OUTPATIENT (OUTPATIENT)
Dept: OUTPATIENT SERVICES | Facility: HOSPITAL | Age: 26
LOS: 1 days | End: 2021-01-26

## 2021-01-26 ENCOUNTER — APPOINTMENT (OUTPATIENT)
Dept: OBGYN | Facility: HOSPITAL | Age: 26
End: 2021-01-26

## 2021-01-26 ENCOUNTER — NON-APPOINTMENT (OUTPATIENT)
Age: 26
End: 2021-01-26

## 2021-01-26 VITALS
TEMPERATURE: 98.1 F | SYSTOLIC BLOOD PRESSURE: 104 MMHG | DIASTOLIC BLOOD PRESSURE: 58 MMHG | HEART RATE: 96 BPM | WEIGHT: 168 LBS

## 2021-01-26 DIAGNOSIS — Z34.80 ENCOUNTER FOR SUPERVISION OF OTHER NORMAL PREGNANCY, UNSPECIFIED TRIMESTER: ICD-10-CM

## 2021-01-28 DIAGNOSIS — Z34.80 ENCOUNTER FOR SUPERVISION OF OTHER NORMAL PREGNANCY, UNSPECIFIED TRIMESTER: ICD-10-CM

## 2021-02-01 ENCOUNTER — NON-APPOINTMENT (OUTPATIENT)
Age: 26
End: 2021-02-01

## 2021-02-02 ENCOUNTER — APPOINTMENT (OUTPATIENT)
Dept: OBGYN | Facility: HOSPITAL | Age: 26
End: 2021-02-02

## 2021-02-04 ENCOUNTER — INPATIENT (INPATIENT)
Facility: HOSPITAL | Age: 26
LOS: 2 days | Discharge: HOME | End: 2021-02-07
Attending: SPECIALIST | Admitting: SPECIALIST
Payer: MEDICAID

## 2021-02-04 VITALS
HEART RATE: 82 BPM | DIASTOLIC BLOOD PRESSURE: 57 MMHG | SYSTOLIC BLOOD PRESSURE: 103 MMHG | TEMPERATURE: 98 F | RESPIRATION RATE: 15 BRPM

## 2021-02-04 DIAGNOSIS — O26.899 OTHER SPECIFIED PREGNANCY RELATED CONDITIONS, UNSPECIFIED TRIMESTER: ICD-10-CM

## 2021-02-04 DIAGNOSIS — Z3A.00 WEEKS OF GESTATION OF PREGNANCY NOT SPECIFIED: ICD-10-CM

## 2021-02-04 DIAGNOSIS — O60.03 PRETERM LABOR WITHOUT DELIVERY, THIRD TRIMESTER: ICD-10-CM

## 2021-02-04 LAB
BASOPHILS # BLD AUTO: 0.02 K/UL — SIGNIFICANT CHANGE UP (ref 0–0.2)
BASOPHILS NFR BLD AUTO: 0.2 % — SIGNIFICANT CHANGE UP (ref 0–2)
BLD GP AB SCN SERPL QL: NEGATIVE — SIGNIFICANT CHANGE UP
EOSINOPHIL # BLD AUTO: 0.13 K/UL — SIGNIFICANT CHANGE UP (ref 0–0.5)
EOSINOPHIL NFR BLD AUTO: 1 % — SIGNIFICANT CHANGE UP (ref 0–6)
HBV SURFACE AG SERPL QL IA: SIGNIFICANT CHANGE UP
HCT VFR BLD CALC: 37.1 % — SIGNIFICANT CHANGE UP (ref 34.5–45)
HGB BLD-MCNC: 10.7 G/DL — LOW (ref 11.5–15.5)
HIV 1+2 AB+HIV1 P24 AG SERPL QL IA: SIGNIFICANT CHANGE UP
IANC: 9.31 K/UL — HIGH (ref 1.5–8.5)
IMM GRANULOCYTES NFR BLD AUTO: 1 % — SIGNIFICANT CHANGE UP (ref 0–1.5)
LYMPHOCYTES # BLD AUTO: 18.4 % — SIGNIFICANT CHANGE UP (ref 13–44)
LYMPHOCYTES # BLD AUTO: 2.4 K/UL — SIGNIFICANT CHANGE UP (ref 1–3.3)
MCHC RBC-ENTMCNC: 22.9 PG — LOW (ref 27–34)
MCHC RBC-ENTMCNC: 28.8 GM/DL — LOW (ref 32–36)
MCV RBC AUTO: 79.4 FL — LOW (ref 80–100)
MONOCYTES # BLD AUTO: 1.05 K/UL — HIGH (ref 0–0.9)
MONOCYTES NFR BLD AUTO: 8.1 % — SIGNIFICANT CHANGE UP (ref 2–14)
NEUTROPHILS # BLD AUTO: 9.31 K/UL — HIGH (ref 1.8–7.4)
NEUTROPHILS NFR BLD AUTO: 71.3 % — SIGNIFICANT CHANGE UP (ref 43–77)
NRBC # BLD: 0 /100 WBCS — SIGNIFICANT CHANGE UP
NRBC # FLD: 0 K/UL — SIGNIFICANT CHANGE UP
PLATELET # BLD AUTO: 459 K/UL — HIGH (ref 150–400)
RBC # BLD: 4.67 M/UL — SIGNIFICANT CHANGE UP (ref 3.8–5.2)
RBC # FLD: 17.2 % — HIGH (ref 10.3–14.5)
RH IG SCN BLD-IMP: POSITIVE — SIGNIFICANT CHANGE UP
SARS-COV-2 RNA SPEC QL NAA+PROBE: SIGNIFICANT CHANGE UP
WBC # BLD: 13.04 K/UL — HIGH (ref 3.8–10.5)
WBC # FLD AUTO: 13.04 K/UL — HIGH (ref 3.8–10.5)

## 2021-02-04 PROCEDURE — 59409 OBSTETRICAL CARE: CPT | Mod: U9,UB,GC

## 2021-02-04 RX ORDER — PRAMOXINE HYDROCHLORIDE 150 MG/15G
1 AEROSOL, FOAM RECTAL EVERY 4 HOURS
Refills: 0 | Status: DISCONTINUED | OUTPATIENT
Start: 2021-02-04 | End: 2021-02-07

## 2021-02-04 RX ORDER — SIMETHICONE 80 MG/1
80 TABLET, CHEWABLE ORAL EVERY 4 HOURS
Refills: 0 | Status: DISCONTINUED | OUTPATIENT
Start: 2021-02-04 | End: 2021-02-07

## 2021-02-04 RX ORDER — OXYCODONE HYDROCHLORIDE 5 MG/1
5 TABLET ORAL ONCE
Refills: 0 | Status: DISCONTINUED | OUTPATIENT
Start: 2021-02-04 | End: 2021-02-07

## 2021-02-04 RX ORDER — SODIUM CHLORIDE 9 MG/ML
1000 INJECTION, SOLUTION INTRAVENOUS
Refills: 0 | Status: DISCONTINUED | OUTPATIENT
Start: 2021-02-04 | End: 2021-02-04

## 2021-02-04 RX ORDER — ACETAMINOPHEN 500 MG
975 TABLET ORAL
Refills: 0 | Status: DISCONTINUED | OUTPATIENT
Start: 2021-02-04 | End: 2021-02-07

## 2021-02-04 RX ORDER — FERROUS SULFATE 325(65) MG
0 TABLET ORAL
Qty: 0 | Refills: 0 | DISCHARGE

## 2021-02-04 RX ORDER — TETANUS TOXOID, REDUCED DIPHTHERIA TOXOID AND ACELLULAR PERTUSSIS VACCINE, ADSORBED 5; 2.5; 8; 8; 2.5 [IU]/.5ML; [IU]/.5ML; UG/.5ML; UG/.5ML; UG/.5ML
0.5 SUSPENSION INTRAMUSCULAR ONCE
Refills: 0 | Status: DISCONTINUED | OUTPATIENT
Start: 2021-02-04 | End: 2021-02-07

## 2021-02-04 RX ORDER — BENZOCAINE 10 %
1 GEL (GRAM) MUCOUS MEMBRANE EVERY 6 HOURS
Refills: 0 | Status: DISCONTINUED | OUTPATIENT
Start: 2021-02-04 | End: 2021-02-07

## 2021-02-04 RX ORDER — IBUPROFEN 200 MG
600 TABLET ORAL EVERY 6 HOURS
Refills: 0 | Status: COMPLETED | OUTPATIENT
Start: 2021-02-04 | End: 2022-01-03

## 2021-02-04 RX ORDER — HYDROCORTISONE 1 %
1 OINTMENT (GRAM) TOPICAL EVERY 6 HOURS
Refills: 0 | Status: DISCONTINUED | OUTPATIENT
Start: 2021-02-04 | End: 2021-02-07

## 2021-02-04 RX ORDER — KETOROLAC TROMETHAMINE 30 MG/ML
30 SYRINGE (ML) INJECTION ONCE
Refills: 0 | Status: DISCONTINUED | OUTPATIENT
Start: 2021-02-04 | End: 2021-02-04

## 2021-02-04 RX ORDER — LANOLIN
1 OINTMENT (GRAM) TOPICAL EVERY 6 HOURS
Refills: 0 | Status: DISCONTINUED | OUTPATIENT
Start: 2021-02-04 | End: 2021-02-07

## 2021-02-04 RX ORDER — DIBUCAINE 1 %
1 OINTMENT (GRAM) RECTAL EVERY 6 HOURS
Refills: 0 | Status: DISCONTINUED | OUTPATIENT
Start: 2021-02-04 | End: 2021-02-07

## 2021-02-04 RX ORDER — AMPICILLIN TRIHYDRATE 250 MG
2 CAPSULE ORAL ONCE
Refills: 0 | Status: COMPLETED | OUTPATIENT
Start: 2021-02-04 | End: 2021-02-04

## 2021-02-04 RX ORDER — DIPHENHYDRAMINE HCL 50 MG
25 CAPSULE ORAL EVERY 6 HOURS
Refills: 0 | Status: DISCONTINUED | OUTPATIENT
Start: 2021-02-04 | End: 2021-02-07

## 2021-02-04 RX ORDER — MAGNESIUM HYDROXIDE 400 MG/1
30 TABLET, CHEWABLE ORAL
Refills: 0 | Status: DISCONTINUED | OUTPATIENT
Start: 2021-02-04 | End: 2021-02-07

## 2021-02-04 RX ORDER — OXYCODONE HYDROCHLORIDE 5 MG/1
5 TABLET ORAL
Refills: 0 | Status: DISCONTINUED | OUTPATIENT
Start: 2021-02-04 | End: 2021-02-07

## 2021-02-04 RX ORDER — SODIUM CHLORIDE 9 MG/ML
3 INJECTION INTRAMUSCULAR; INTRAVENOUS; SUBCUTANEOUS EVERY 8 HOURS
Refills: 0 | Status: DISCONTINUED | OUTPATIENT
Start: 2021-02-04 | End: 2021-02-07

## 2021-02-04 RX ORDER — AMPICILLIN TRIHYDRATE 250 MG
1 CAPSULE ORAL EVERY 4 HOURS
Refills: 0 | Status: DISCONTINUED | OUTPATIENT
Start: 2021-02-04 | End: 2021-02-04

## 2021-02-04 RX ORDER — AER TRAVELER 0.5 G/1
1 SOLUTION RECTAL; TOPICAL EVERY 4 HOURS
Refills: 0 | Status: DISCONTINUED | OUTPATIENT
Start: 2021-02-04 | End: 2021-02-07

## 2021-02-04 RX ORDER — OXYTOCIN 10 UNIT/ML
333.33 VIAL (ML) INJECTION
Qty: 20 | Refills: 0 | Status: DISCONTINUED | OUTPATIENT
Start: 2021-02-04 | End: 2021-02-05

## 2021-02-04 RX ORDER — OXYTOCIN 10 UNIT/ML
333.33 VIAL (ML) INJECTION
Qty: 20 | Refills: 0 | Status: DISCONTINUED | OUTPATIENT
Start: 2021-02-04 | End: 2021-02-04

## 2021-02-04 RX ADMIN — Medication 1000 MILLIUNIT(S)/MIN: at 15:53

## 2021-02-04 RX ADMIN — Medication 975 MILLIGRAM(S): at 20:50

## 2021-02-04 RX ADMIN — Medication 30 MILLIGRAM(S): at 15:50

## 2021-02-04 RX ADMIN — SODIUM CHLORIDE 125 MILLILITER(S): 9 INJECTION, SOLUTION INTRAVENOUS at 08:53

## 2021-02-04 RX ADMIN — Medication 216 GRAM(S): at 08:09

## 2021-02-04 RX ADMIN — SODIUM CHLORIDE 3 MILLILITER(S): 9 INJECTION INTRAMUSCULAR; INTRAVENOUS; SUBCUTANEOUS at 22:00

## 2021-02-04 NOTE — OB RN DELIVERY SUMMARY - NS_SEPSISRSKCALC_OBGYN_ALL_OB_FT
GBS status in the 'Prenatal Lab tests/results section' on the OB RN Patient Profile must be documented.   EOS calculated successfully. EOS Risk Factor: 0.5/1000 live births ([facility] national incidence); GA=36w6d; Temp=98.42; ROM=0.583; GBS='Unknown'; Antibiotics='GBS specific antibiotics > 2 hrs prior to birth'

## 2021-02-04 NOTE — OB PROVIDER TRIAGE NOTE - HISTORY OF PRESENT ILLNESS
26 y/o  at 36.6weeks presents to triage with c/o contractions since 11:30pm. Pt reports +fm. Denies ANABEL WELCH.

## 2021-02-04 NOTE — OB PROVIDER DELIVERY SUMMARY - NSPROVIDERDELIVERYNOTE_OBGYN_ALL_OB_FT
Spontaneous vaginal delivery of liveborn infant from OA position. Head, shoulders, and body delivered easily. Infant was suctioned. No mec. Delayed cord clamping and infant was passed to mother. Cord clamped and cut. Placenta delivered intact with a 3-vessel cord. Fundal massage was given and uterine fundus was found to be firm. Vaginal exam revealed an intact cervix, vaginal walls, sulci, and perineum. Excellent hemostasis was noted. Patient was stable and went to recovery. Count was correct x 2.    Katiuska Mancini PGY1

## 2021-02-04 NOTE — OB RN PATIENT PROFILE - PMH
Gestational diabetes mellitus (GDM) during first pregnancy    Preeclampsia  during first pregnancy  Tuberculosis

## 2021-02-04 NOTE — CHART NOTE - NSCHARTNOTESELECT_GEN_ALL_CORE
Event Note their primary care physician by calling their office tomorrow. --------------------------------- ADDITIONAL PROVIDER NOTES ---------------------------------      Incision and Drainage Procedure Note    Indication: Thrombosed hemorrhoid    Procedure: The patient was positioned appropriately and the skin over the incision site was prepped with alcohol. Local anesthesia was obtained by infiltration using 1% Lidocaine without epinephrine. An incision was then made over the apex of the lesion and approximately 6 cc of bloody material was expressed. The patient tolerated the procedure well. Complications: bleeding        Medical Decision Making:    Presents with severe pain from thrombosed hemorrhoids. Patient offered incision and drainage was performed with some improvement. Patient given lidocaine for further relief. Patient educated on thrombose and plan. Patient given Colace    At this time the patient is without objective evidence of an acute process requiring hospitalization or inpatient management. They have remained hemodynamically stable throughout their entire ED visit and are stable for discharge with outpatient follow-up. The plan has been discussed in detail and they are aware of the specific conditions for emergent return, as well as the importance of follow-up. New Prescriptions    No medications on file       Diagnosis:  1. Thrombosed hemorrhoids        Disposition:  Patient's disposition: Discharge to home  Patient's condition is stable. NOTE: This report was transcribed using voice recognition software.  Every effort was made to ensure accuracy; however, inadvertent computerized transcription errors may be present           Jacinta Vasquez DO  Resident  01/12/20 0127

## 2021-02-04 NOTE — OB NEONATOLOGY/PEDIATRICIAN DELIVERY SUMMARY - NSPEDSNEONOTESA_OBGYN_ALL_OB_FT
Baby is a 36.6 wk GA male born to a 24 y/o  mother via . PEDS called to delivery for . Maternal history uncomplicated. Prenatal history uncomplicated. Maternal blood type B+. PNL negative, non-reactive, and immune, Hep. B pending. GBS unknown. AROM at 1308 on , fluids. Baby born vigorous and crying spontaneously. Warmed, dried, stimulated. Apgars 7/8 for color and tone. Baby's tone began improving after 10 minutes. EOS 0.04. Mom plans to breastfeed and consents hepB. Circ requested.

## 2021-02-04 NOTE — CHART NOTE - NSCHARTNOTEFT_GEN_A_CORE
AROM, clear fluid. Epidural in place. Pt receiving ampicillin. Pt making cervical change with expectant management.   SVE:9.2/100/0  Cat 1 tracing.   Anticipate     RShibata, pgy4

## 2021-02-04 NOTE — OB PROVIDER TRIAGE NOTE - NSHPPHYSICALEXAM_GEN_ALL_CORE
Lung CTAB  Heart RRR  Abdomen soft and nontender      Contractions 1-3   CAT 1    Sono:   confirmed for presentation- cephalic     VE: 5/80/BB

## 2021-02-04 NOTE — OB PROVIDER H&P - NS_BEFORE39WEEKS_OBGYN_ALL_OB
Bladimir Barakat is a 53 year old male here for a routine eye exam.   Last Eye Dilation Date:  06/26/2018  He does  need more contacts.     He reports it is harder and harder for him to see at night. He would like to avoid a bifocal as long as he can. It is more of a struggle going from computer to close work.  He never bought contacts from the Cl rx from last time. He used up boxes from the time before.       I have reviewed the patient's medications and allergies, past medical, surgical, social and family history, updating these as appropriate.  See Histories section of the EMR for a display of this information.        ASSESSMENT:  1. Myopia with astigmatism and presbyopia, bilateral    2.  Eye health otherwise within normal limits.      PLAN:  1.  Updated glasses & CL RX's with slight changes.  Advised getting first time multifocal glasses which should help near focusing a lot.  Use +200 or so readers over contact lenses for near work prn.  2.  Return for next routine eye exam and contact lens evaluation in 1 year or sooner as needed.  Pupil dilation advised next year.      Adali Carrillo, OD     Yes

## 2021-02-04 NOTE — OB PROVIDER H&P - HISTORY OF PRESENT ILLNESS
24 y/o  at 36.6weeks presents to triage with c/o contractions since 11:30pm. Pt reports +fm. Denies ANABEL WELCH.

## 2021-02-04 NOTE — OB PROVIDER TRIAGE NOTE - PMH
Gestational diabetes mellitus (GDM) during first pregnancy    Preeclampsia  during first pregnancy

## 2021-02-05 ENCOUNTER — TRANSCRIPTION ENCOUNTER (OUTPATIENT)
Age: 26
End: 2021-02-05

## 2021-02-05 DIAGNOSIS — Z86.11 PERSONAL HISTORY OF TUBERCULOSIS: ICD-10-CM

## 2021-02-05 LAB — T PALLIDUM AB TITR SER: NEGATIVE — SIGNIFICANT CHANGE UP

## 2021-02-05 PROCEDURE — 71250 CT THORAX DX C-: CPT | Mod: 26

## 2021-02-05 RX ORDER — IBUPROFEN 200 MG
600 TABLET ORAL EVERY 6 HOURS
Refills: 0 | Status: DISCONTINUED | OUTPATIENT
Start: 2021-02-05 | End: 2021-02-07

## 2021-02-05 RX ORDER — IBUPROFEN 200 MG
1 TABLET ORAL
Qty: 0 | Refills: 0 | DISCHARGE
Start: 2021-02-05

## 2021-02-05 RX ORDER — ACETAMINOPHEN 500 MG
3 TABLET ORAL
Qty: 0 | Refills: 0 | DISCHARGE
Start: 2021-02-05

## 2021-02-05 RX ADMIN — Medication 600 MILLIGRAM(S): at 12:00

## 2021-02-05 RX ADMIN — OXYCODONE HYDROCHLORIDE 5 MILLIGRAM(S): 5 TABLET ORAL at 20:35

## 2021-02-05 RX ADMIN — Medication 600 MILLIGRAM(S): at 23:56

## 2021-02-05 RX ADMIN — Medication 975 MILLIGRAM(S): at 06:00

## 2021-02-05 RX ADMIN — Medication 975 MILLIGRAM(S): at 20:35

## 2021-02-05 RX ADMIN — Medication 1 TABLET(S): at 19:46

## 2021-02-05 RX ADMIN — SODIUM CHLORIDE 3 MILLILITER(S): 9 INJECTION INTRAMUSCULAR; INTRAVENOUS; SUBCUTANEOUS at 06:02

## 2021-02-05 NOTE — PROGRESS NOTE ADULT - ATTENDING COMMENTS
Associate Chief of L & D (Late entry)     I have not met this patinet before today.  she was admitted ini early labor by Dr Tripp and delivered by him as well.  Of note,  she had TB treated in  and was found to have a nodule on CXR.  she also had PEC with her prior pregnancy    OB Progress Note:  PPD#1    S: 26yo  PPD#1 s/p . Patient denies any complaints at this time    O:  Vitals:  Vital Signs Last 24 Hrs  T(C): 36.8 (2021 05:56), Max: 36.9 (2021 11:33)  T(F): 98.3 (2021 05:56), Max: 98.5 (2021 21:37)  HR: 84 (2021 05:56) (68 - 115)  BP: 104/57 (2021 05:56) (89/54 - 129/66)  RR: 17 (2021 05:56) (16 - 17)  SpO2: 99% (2021 05:56) (85% - 100%)    MEDICATIONS  (STANDING):  acetaminophen   Tablet .. 975 milliGRAM(s) Oral <User Schedule>  diphtheria/tetanus/pertussis (acellular) Vaccine (ADAcel) 0.5 milliLiter(s) IntraMuscular once  ibuprofen  Tablet. 600 milliGRAM(s) Oral every 6 hours  prenatal multivitamin 1 Tablet(s) Oral daily  sodium chloride 0.9% lock flush 3 milliLiter(s) IV Push every 8 hours      Labs:  Blood type: B Positive  Rubella IgG: Positive (10-29 @ 12:40)  RPR: Negative                          10.7<L>   13.04<H> >-----------< 459<H>    (  @ 08:22 )             37.1      Physical Exam:  Abdomen: soft, fundus firm, NT, ND  Vaginal: Lochia scant  Extremities: No erythema/ trace edema    A/P: 26yo PPD#1 s/p  with h/o TB who was treated  with INH for 9 months and with nodule on CXR.  She also has a h/o PEC with a prior pregnancy  - Pain well controlled, continue current pain regimen  - Increase ambulation, SCDs when not ambulating  - Continue regular diet  - CT of the lung for further evaluation of lung nodule  - Monitor BP's    Kassandra Cardoso M.D., M.B.A., M.S.

## 2021-02-05 NOTE — DISCHARGE NOTE OB - COMMUNITY RESOURCE NAME:
Patient instructed to make a postpartum follow up appointment at the Ambulatory Care Unit at [facility]  for 4-6 weeks after her delivery date. Patient also instructed to make follow up appointment for the baby at [facility] Division of General Pediatrics  for 1-2 days after discharge.

## 2021-02-05 NOTE — DISCHARGE NOTE OB - HOSPITAL COURSE
34 y.o.  with PMHx of TB in  s/p tx with INH x 9months and CXR from 2020 showing ill defined opacity initially presented to triage in labor. Patient had a vaginal delivery, . On PPD#1, patient had chest CT completed due to hx of TB which showed RLL soft tissue mass 8.5 x 3.7 x 7.7 pulmonary sequestration. Patient was seen by pulmonology with recommendation for follow up and CT chest w/ IV contrast which was completed. The patient met all appropriate post partum milestones.  The patient was able to void, tolerated a regular diet, and ambulated on her own.  The patient was discharged on PPD#2 afebrile, with stable vital signs, and appropriate pain control.

## 2021-02-05 NOTE — DISCHARGE NOTE OB - CHANGE SANITARY PADS FREQUENTLY.  WASHING AND WIPING SHOULD OCCUR FROM FRONT TO BACK
[facility] [employer] of Child Health Examination       Student's Name  Rohit Teagan Birth Date Date     Signature                                                                                                                                              Title                           Date    (If adding dates to the above immunization histo insect, other)  Patient has no allergy information on record. MEDICATION  (List all prescribed or taken on a regular basis.)  No current outpatient medications on file. Diagnosis of asthma?   Child wakes during the night coughing   Yes   No    Yes   No the following:  Family History No    Ethnic Minority  No          Signs of Insulin Resistance (hypertension, dyslipidemia, polycystic ovarian syndrome, acanthosis nigricans)    No           At Risk  No   Lead Risk Questionnaire  Req'd for children 6 months (e.g. inhaled corticosteroid):   No Other   NEEDS/MODIFICATIONS required in the school setting  None DIETARY Needs/Restrictions     None   SPECIAL INSTRUCTIONS/DEVICES e.g. safety glasses, glass eye, chest protector for arrhythmia, pacemaker, prosthetic de Statement Selected

## 2021-02-05 NOTE — DISCHARGE NOTE OB - PROVIDER TOKENS
FREE:[LAST:[[facility]],PHONE:[phone]],FAX:[(   )    -],ADDRESS:[Ambulatory Care Unit, [facility]  [address] [place] [address]]] FREE:[LAST:[[facility]],PHONE:[phone]],FAX:[(   )    -],ADDRESS:[Ambulatory Care Unit[place] [address]]],FREE:[LAST:[Pulmonary/Sleep Clinic],PHONE:[phone]:[(   )    -],ADDRESS:[[address] [place] [address]]]

## 2021-02-05 NOTE — DISCHARGE NOTE OB - PLAN OF CARE
full recovery Make your follow-up appointment with your doctor as ordered.  Make an appt in 6 weeks for a routine postpartum visit.     No heavy lifting, driving, or strenuous activity for 6 weeks. Nothing per vagina such as tampons, intercourse, douches, or tub baths for 6 weeks or until you see your doctor. Call your doctor with any signs and symptoms of infection such as fever, chills, nausea, or vomiting. Call your doctor if you're unable to tolerate food, increase in vaginal bleeding, or have difficulty urinating. Call your doctor if you have pain that is not relieved by your prescribed medications. Notify your doctor with any other concerns.     Call [phone] if you have any of these concerns in the next 6 weeks.

## 2021-02-05 NOTE — DISCHARGE NOTE OB - PATIENT PORTAL LINK FT
You can access the FollowMyHealth Patient Portal offered by [facility] by registering at the following website: [email or web address]. By joining [employer]’s FollowMyHealth portal, you will also be able to view your health information using other applications (apps) compatible with our system.

## 2021-02-05 NOTE — PROGRESS NOTE ADULT - SUBJECTIVE AND OBJECTIVE BOX
OB Progress Note:  PPD#1    S: 26yo PPD#1 s/p . Patient feels well. Pain is well controlled. She is tolerating a regular diet and passing flatus. She is voiding spontaneously, and ambulating without difficulty. Denies CP/SOB. Denies lightheadedness/dizziness. Denies N/V. Patient has h/o TB in  s/p tx with INH x9 months, and CXR from 20 shows ill-defined opacity. Chest CT was recommended, and thus ordered yesterday, but patient has not yet had her chest CT. She denies any symptoms of fevers/chills/cough/weight change/appetite change.    O:  Vitals:  Vital Signs Last 24 Hrs  T(C): 36.8 (2021 05:56), Max: 36.9 (2021 11:33)  T(F): 98.3 (2021 05:56), Max: 98.5 (2021 21:37)  HR: 84 (2021 05:56) (61 - 115)  BP: 104/57 (2021 05:56) (76/46 - 129/66)  RR: 17 (2021 05:56) (16 - 17)  SpO2: 99% (2021 05:56) (85% - 100%)    MEDICATIONS  (STANDING):  acetaminophen   Tablet .. 975 milliGRAM(s) Oral <User Schedule>  diphtheria/tetanus/pertussis (acellular) Vaccine (ADAcel) 0.5 milliLiter(s) IntraMuscular once  ibuprofen  Tablet. 600 milliGRAM(s) Oral every 6 hours  oxytocin Infusion 333.333 milliUNIT(s)/Min (1000 mL/Hr) IV Continuous <Continuous>  prenatal multivitamin 1 Tablet(s) Oral daily  sodium chloride 0.9% lock flush 3 milliLiter(s) IV Push every 8 hours    Labs:  Blood type: B Positive  Rubella IgG: Positive (10-29 @ 12:40)  RPR: Negative                          10.7<L>   13.04<H> >-----------< 459<H>    (  @ 08:22 )             37.1    Physical Exam:  General: NAD  CV: RRR  Resp: CTABL, no w/r/r  Abdomen: soft, non-tender, non-distended, fundus firm  Vaginal: Lochia wnl  Extremities: No erythema/edema

## 2021-02-05 NOTE — PROGRESS NOTE ADULT - PROBLEM SELECTOR PLAN 1
- Pain well controlled, continue current pain regimen  - Increase ambulation  - Continue regular diet  - Consents circumcision  - Contraception: pp Paragard  - Discharge planning

## 2021-02-05 NOTE — DISCHARGE NOTE OB - MEDICATION SUMMARY - MEDICATIONS TO TAKE
I will START or STAY ON the medications listed below when I get home from the hospital:    Iron 100 Plus oral tablet  -- 350 milligram(s) by mouth 2 times a day  -- Indication: For Vitamins    vitamin D  -- Indication: For Vitamins    acetaminophen 325 mg oral tablet  -- 3 tab(s) by mouth   -- Indication: For Pain    ibuprofen 600 mg oral tablet  -- 1 tab(s) by mouth every 6 hours  -- Indication: For Pain    PNV Prenatal oral tablet  -- 1 tab(s) by mouth once a day  -- Indication: For Vitamins

## 2021-02-05 NOTE — DISCHARGE NOTE OB - ADDITIONAL INSTRUCTIONS
Make your follow-up appointment with your doctor as ordered.  Make an appt in 6 weeks for a routine postpartum visit.     No heavy lifting, driving, or strenuous activity for 6 weeks. Nothing per vagina such as tampons, intercourse, douches, or tub baths for 6 weeks or until you see your doctor. Call your doctor with any signs and symptoms of infection such as fever, chills, nausea, or vomiting. Call your doctor if you're unable to tolerate food, increase in vaginal bleeding, or have difficulty urinating. Call your doctor if you have pain that is not relieved by your prescribed medications. Notify your doctor with any other concerns.     Call [phone] if you have any of these concerns in the next 6 weeks. No heavy lifting, driving, or strenuous activity for 6 weeks. Nothing per vagina such as tampons, intercourse, douches, or tub baths for 6 weeks or until you see your doctor. Call your doctor with any signs and symptoms of infection such as fever, chills, nausea, or vomiting. Call your doctor if you're unable to tolerate food, increase in vaginal bleeding, or have difficulty urinating. Call your doctor if you have pain that is not relieved by your prescribed medications. Notify your doctor with any other concerns.     Follow up with the [facility] clinic for your postpartum visit in 4-6 weeks. Please also follow up with the lung doctors/ pulmonologists following the imaging that was done on your lungs ([phone])

## 2021-02-05 NOTE — DISCHARGE NOTE OB - MATERIALS PROVIDED
Vaccinations/[facility]  Screening Program/  Immunization Record/Breastfeeding Log/Bottle Feeding Log/Breastfeeding Mother’s Support Group Information/Guide to Postpartum Care/[facility] Hearing Screen Program/Back To Sleep Handout/Shaken Baby Prevention Handout/Breastfeeding Guide and Packet/Birth Certificate Instructions/Discharge Medication Information for Patients and Families Pocket Guide

## 2021-02-05 NOTE — DISCHARGE NOTE OB - CARE PLAN
Principal Discharge DX:	 (normal spontaneous vaginal delivery)  Goal:	full recovery  Assessment and plan of treatment:	Make your follow-up appointment with your doctor as ordered.  Make an appt in 6 weeks for a routine postpartum visit.     No heavy lifting, driving, or strenuous activity for 6 weeks. Nothing per vagina such as tampons, intercourse, douches, or tub baths for 6 weeks or until you see your doctor. Call your doctor with any signs and symptoms of infection such as fever, chills, nausea, or vomiting. Call your doctor if you're unable to tolerate food, increase in vaginal bleeding, or have difficulty urinating. Call your doctor if you have pain that is not relieved by your prescribed medications. Notify your doctor with any other concerns.     Call [phone] if you have any of these concerns in the next 6 weeks.

## 2021-02-05 NOTE — DISCHARGE NOTE OB - CARE PROVIDER_API CALL
[facility],   Ambulatory Care Unit, Oncolog Basement  [phone] [place] [address]  Phone: [phone]  Fax: (   )    -  Follow Up Time:    [facility],   Ambulatory Care Unit, Oncolog Basement  [phone] [place] [address]  Phone: [phone]  Fax: (   )    -  Follow Up Time:     Pulmonary/Sleep Clinic,   [address] [place] [address]  Phone: [phone]  Fax: (   )    -  Follow Up Time:

## 2021-02-06 PROCEDURE — 99223 1ST HOSP IP/OBS HIGH 75: CPT | Mod: GC

## 2021-02-06 PROCEDURE — 71260 CT THORAX DX C+: CPT | Mod: 26

## 2021-02-06 RX ADMIN — Medication 975 MILLIGRAM(S): at 03:40

## 2021-02-06 RX ADMIN — Medication 600 MILLIGRAM(S): at 17:50

## 2021-02-06 RX ADMIN — SODIUM CHLORIDE 3 MILLILITER(S): 9 INJECTION INTRAMUSCULAR; INTRAVENOUS; SUBCUTANEOUS at 22:00

## 2021-02-06 RX ADMIN — Medication 975 MILLIGRAM(S): at 09:19

## 2021-02-06 RX ADMIN — Medication 1 TABLET(S): at 11:49

## 2021-02-06 RX ADMIN — Medication 600 MILLIGRAM(S): at 11:49

## 2021-02-06 NOTE — PROGRESS NOTE ADULT - PROBLEM SELECTOR PLAN 2
- s/p Tx with INH x9 months in 2010   - CXR from 12/2020 reviewed shows possible opacity   - Patient asymptomatic and VS stable  - Patient scheduled for chest CT today  - Continue to monitor symptoms and VS    Katiuska Mancini, PGY1
- CT reviewed, will d/w attending re: course of management    Katiuska Mancini MD PGY1

## 2021-02-06 NOTE — CONSULT NOTE ADULT - ATTENDING COMMENTS
Agree with above.  Patient seen and examined. Chart reviewed.    RLL lung mass with broad differential need CT with iv contrast to rule out pulmonary sequestration if that is negative the patient will need biopsy of the mass.  Can follow up with pulmonary as an out patient to review results of scan and plan further work up if necessary.    Plan was discussed with the patient and her .

## 2021-02-06 NOTE — CONSULT NOTE ADULT - ASSESSMENT
25 year-old F with PMH of latent TB in 2010 s/p INH who presented to the hospital for a normal, spontaneous vaginal delivery. Pulmonary consulted for RLL mass seen on CT Chest.    RLL Lung Mass  - 25 year-old F with PMH of latent TB in  s/p INH who presented to the hospital for a normal, spontaneous vaginal delivery. Pulmonary consulted for RLL mass seen on CT Chest.    RLL Lung Mass  - Differential broad, potentially pulmonary sequestration  - Would check CT Chest w/ IV contrast to r/p sequestration  - Depending on results, patient may require biopsy  - Patient can follow up with pulmonary as an outpatient for further review of imaging and biopsy  - Would first contact IR regarding possible transthoracic biopsy. If unable, patient would potentially require navigational bronchoscopy  - Please email: [email or web address] to setup an appointment prior to discharge. Include the patient's name, , MRN and contact information in the email.      Pulmonary/Sleep Clinic  [address] [place] [address]  [phone]    Case discussed with OB    Jhonathan Lindquist, PGY-5  Pulmonary and Critical Care Medicine  [id number]

## 2021-02-06 NOTE — PROGRESS NOTE ADULT - PROBLEM SELECTOR PLAN 1
- Pain well controlled, continue current pain regimen  - Increase ambulation  - Continue regular diet  - Circ: completed  - Contraception: plans for 6wk pp IUD  - Discharge planning

## 2021-02-06 NOTE — PROGRESS NOTE ADULT - SUBJECTIVE AND OBJECTIVE BOX
OB Progress Note:  PPD#2    S: 26yo h/o TB in 2010 s/p INH tx x9 months PPD#2 s/p . Patient feels well. Pain is well controlled. She is tolerating a regular diet and passing flatus. She is voiding spontaneously, and ambulating without difficulty. Denies fevers/chills/coughCP/SOB. Denies lightheadedness/dizziness. Denies N/V. She received a chest CT yesterday after CXR from  was reviewed and noted to have ill-defined opacities.     O:  Vitals:   Vital Signs Last 24 Hrs  T(C): 36.5 (2021 05:59), Max: 36.9 (2021 18:04)  T(F): 97.7 (2021 05:59), Max: 98.4 (2021 18:04)  HR: 60 (2021 05:59) (60 - 73)  BP: 100/57 (2021 05:59) (100/57 - 100/65)  BP(mean): --  RR: 16 (2021 05:59) (16 - 16)  SpO2: 99% (2021 05:59) (97% - 99%)    MEDICATIONS  (STANDING):  acetaminophen   Tablet .. 975 milliGRAM(s) Oral <User Schedule>  diphtheria/tetanus/pertussis (acellular) Vaccine (ADAcel) 0.5 milliLiter(s) IntraMuscular once  ibuprofen  Tablet. 600 milliGRAM(s) Oral every 6 hours  prenatal multivitamin 1 Tablet(s) Oral daily  sodium chloride 0.9% lock flush 3 milliLiter(s) IV Push every 8 hours    MEDICATIONS  (PRN):  benzocaine 20%/menthol 0.5% Spray 1 Spray(s) Topical every 6 hours PRN for Perineal discomfort  dibucaine 1% Ointment 1 Application(s) Topical every 6 hours PRN Perineal discomfort  diphenhydrAMINE 25 milliGRAM(s) Oral every 6 hours PRN Pruritus  hydrocortisone 1% Cream 1 Application(s) Topical every 6 hours PRN Moderate Pain (4-6)  lanolin Ointment 1 Application(s) Topical every 6 hours PRN nipple soreness  magnesium hydroxide Suspension 30 milliLiter(s) Oral two times a day PRN Constipation  oxyCODONE    IR 5 milliGRAM(s) Oral every 3 hours PRN Moderate to Severe Pain (4-10)  oxyCODONE    IR 5 milliGRAM(s) Oral once PRN Moderate to Severe Pain (4-10)  pramoxine 1%/zinc 5% Cream 1 Application(s) Topical every 4 hours PRN Moderate Pain (4-6)  simethicone 80 milliGRAM(s) Chew every 4 hours PRN Gas  witch hazel Pads 1 Application(s) Topical every 4 hours PRN Perineal discomfort      Labs:  Blood type: B Positive  Rubella IgG: Positive (10-29 @ 12:40)  RPR: Negative                          10.7<L>   13.04<H> >-----------< 459<H>    (  @ 08:22 )             37.1    Physical Exam:  General: NAD  CV: RRR  Resp: CTABL  Abdomen: soft, non-tender, non-distended, fundus firm  Vaginal: Lochia wnl  Extremities: No erythema/edema

## 2021-02-06 NOTE — CONSULT NOTE ADULT - SUBJECTIVE AND OBJECTIVE BOX
CC: S/p vaginal delivery    HPI:  24 y/o  at 36.6weeks presents to triage with c/o contractions since 11:30pm. Pt reports +fm. Denies [employer]EL[employer].  (2021 07:59)    Patient seen and examined at bedside. She is a 25 year-old F with PMH latent TB (diagnosed in , s/p INH x9 months) who presented to the hospital for a normal, spontaneous vaginal delivery. She had a CXR in 2020 that showed a right sided opacity. She was recommended to follow up with ID but did not attend the appointment. She had a CT Chest yesterday, , which showed a RLL mass. She currently reports that she feels well. She denies SOB, cough, CP, fevers/chills, and lower extremity edema. She denies history of tobacco use, alcohol, and other substances. She does not have any family history of pulmonary illnesses. She denies occupational exposures and reports that she came to the [place] from [place] 11 years ago. She reports that she had only had chest imaging prior to the CXR in December when diagnosed with latent TB and her results were negative. She denies having pets.    PAST MEDICAL & SURGICAL HISTORY:  Tuberculosis    Preeclampsia  during first pregnancy    Gestational diabetes mellitus (GDM) during first pregnancy    No significant past surgical history        FAMILY HISTORY:      SOCIAL HISTORY:  Smoking: None  EtOH Use: None  Marital Status:   Occupation: Unemployed  Exposures: None reported  Recent Travel: None    Allergies    No Known Allergies    Intolerances    HOME MEDICATIONS:    REVIEW OF SYSTEMS:  Constitutional: No fevers or chills. No weight loss. No fatigue or generalised malaise.  Eyes: No itching or discharge from the eyes  ENT: No ear pain. No ear discharge. No nasal congestion. No post nasal drip. No epistaxis. No throat pain. No sore throat. No difficulty swallowing.   CV: No chest pain. No palpitations. No lightheadedness or dizziness.   Resp: No dyspnea at rest. No dyspnea on exertion. No orthopnea. No wheezing. No cough. No stridor. No sputum production. No chest pain with respiration.  GI: No nausea. No vomiting. No diarrhea.  MSK: No joint pain or pain in any extremities  Integumentary: No skin lesions. No pedal edema.  Neurological: No gross motor weakness. No sensory changes.  [x] All other systems negative  [ ] Unable to assess ROS because ________    OBJECTIVE:  ICU Vital Signs Last 24 Hrs  T(C): 36.5 (2021 05:59), Max: 36.9 (2021 18:04)  T(F): 97.7 (2021 05:59), Max: 98.4 (2021 18:04)  HR: 60 (2021 05:59) (60 - 73)  BP: 100/57 (2021 05:59) (100/57 - 100/65)  BP(mean): --  ABP: --  ABP(mean): --  RR: 16 (2021 05:59) (16 - 16)  SpO2: 99% (2021 05:59) (97% - 99%)    CAPILLARY BLOOD GLUCOSE    PHYSICAL EXAM:  General: Awake, alert, oriented X 3.   HEENT: Atraumatic, normocephalic.   Lymph Nodes: No palpable lymphadenopathy  Neck: No JVD. No carotid bruit.   Respiratory: Normal chest expansion. Clear bilaterally.  Cardiovascular: S1 S2 normal. No murmurs, rubs or gallops.   Abdomen: Soft, non-tender, non-distended.  Extremities: Warm to touch. Peripheral pulse palpable. No pedal edema.   Skin: No rashes or skin lesions  Neurological: Motor and sensory examination equal and normal in all four extremities.  Psychiatry: Appropriate mood and affect.    HOSPITAL MEDICATIONS:  MEDICATIONS  (STANDING):  acetaminophen   Tablet .. 975 milliGRAM(s) Oral <User Schedule>  diphtheria/tetanus/pertussis (acellular) Vaccine (ADAcel) 0.5 milliLiter(s) IntraMuscular once  ibuprofen  Tablet. 600 milliGRAM(s) Oral every 6 hours  prenatal multivitamin 1 Tablet(s) Oral daily  sodium chloride 0.9% lock flush 3 milliLiter(s) IV Push every 8 hours    MEDICATIONS  (PRN):  benzocaine 20%/menthol 0.5% Spray 1 Spray(s) Topical every 6 hours PRN for Perineal discomfort  dibucaine 1% Ointment 1 Application(s) Topical every 6 hours PRN Perineal discomfort  diphenhydrAMINE 25 milliGRAM(s) Oral every 6 hours PRN Pruritus  hydrocortisone 1% Cream 1 Application(s) Topical every 6 hours PRN Moderate Pain (4-6)  lanolin Ointment 1 Application(s) Topical every 6 hours PRN nipple soreness  magnesium hydroxide Suspension 30 milliLiter(s) Oral two times a day PRN Constipation  oxyCODONE    IR 5 milliGRAM(s) Oral every 3 hours PRN Moderate to Severe Pain (4-10)  oxyCODONE    IR 5 milliGRAM(s) Oral once PRN Moderate to Severe Pain (4-10)  pramoxine 1%/zinc 5% Cream 1 Application(s) Topical every 4 hours PRN Moderate Pain (4-6)  simethicone 80 milliGRAM(s) Chew every 4 hours PRN Gas  witch hazel Pads 1 Application(s) Topical every 4 hours PRN Perineal discomfort      LABS:                    MICROBIOLOGY:     RADIOLOGY:  [x] Reviewed and interpreted by me,  < from: CT Chest No Cont (21 @ 09:47) >  EXAM:  CT CHEST        PROCEDURE DATE:  2021         INTERPRETATION:  Reason for Exam:  Evaluation of right perihilar opacity seen on chest x-ray. History of tuberculosis.    CT of the chest was performed from the thoracic inlet to the levelof the adrenal glands without contrast injection.    Comparison: Chest x-ray 2020.    Mediastinum/Vessels/Heart: Aorta and pulmonary arteries are normal in size. There is no pericardial effusion. No lymphadenopathy. Thyroid gland is unremarkable    Lungs/Pleura/Airways: A large right lower lobe lung mass is noted measuring approximately 8.5 x 3.7 x 7.7 cm with irregular borders and without air bronchograms internally. There is sparing of the fat plane between the right lung and spine. This mass measures as soft tissue density. There is a nonspecific area of relatively increased lucency in the superior aspect of the left lower lobe. No pneumothorax.    Visualized abdomen: Hepatomegaly.    Bones and soft tissues: No suspicious osseous lesions.    IMPRESSION:    Large right lower lobe mass as described above. This may represent a pulmonary sequestration, a primary lung mass, extra medullary hematopoiesis or pleural-based mass. Neurogenic tumor is also possible, though less likely given preservation of the paraspinal fat plane. Contrast-enhanced CT may be helpful for further evaluate for sequestration. Additionally, submission of any available prior outside CT images may be helpful for comparison.              ELIEZER CLARKE MD; Resident Radiology  This document has been electronically signed.  IFRAH WEBSTER MD; Attending Radiologist  This document has been electronically signed. 2021 12:32PM    < end of copied text >      Point of Care Ultrasound Findings;    PFT:    EKG:

## 2021-02-06 NOTE — PROGRESS NOTE ADULT - ATTENDING COMMENTS
26y/o  PPD#2 s/p . Past h/o TB in  s/p INH Tx x 9 months. CXR  noted [re-demonstrated] ill-defined opacity along right heart border. CT was advised and was done post-partum and was remarkable for a large right lower lobe lung mass is noted measuring approximately 8.5 x 3.7 x 7.7 cm with irregular borders. IMPRESSION: This may represent a pulmonary sequestration, a primary lung mass, extra medullary hematopoiesis or pleural-based mass. Neurogenic tumor is also possible, though less likely given preservation of the paraspinal fat plane.  Patient to have a pulmonary consult to plan further w/u and treatment prior to d/c home.

## 2021-02-07 VITALS
OXYGEN SATURATION: 99 % | SYSTOLIC BLOOD PRESSURE: 113 MMHG | TEMPERATURE: 98 F | DIASTOLIC BLOOD PRESSURE: 61 MMHG | HEART RATE: 80 BPM | RESPIRATION RATE: 16 BRPM

## 2021-02-07 RX ADMIN — SODIUM CHLORIDE 3 MILLILITER(S): 9 INJECTION INTRAMUSCULAR; INTRAVENOUS; SUBCUTANEOUS at 06:41

## 2021-02-07 RX ADMIN — Medication 600 MILLIGRAM(S): at 05:32

## 2021-02-07 RX ADMIN — Medication 600 MILLIGRAM(S): at 12:12

## 2021-02-07 RX ADMIN — Medication 1 TABLET(S): at 12:12

## 2021-02-07 NOTE — PROGRESS NOTE ADULT - PROBLEM SELECTOR PLAN 1
- Pain well controlled, continue current pain regimen  - Increase ambulation, SCDs when not ambulating  - Continue regular diet  - Circ performed  - Contraception: desires pp IUD at 6 week postpartum visit  - Discharge planning     Katiuska Mancini MD PGY1 - Pulm recs appreciated: per recs, patient to f/u outpatient  - Pain well controlled, continue current pain regimen  - Increase ambulation, SCDs when not ambulating  - Continue regular diet  - Circ performed  - Contraception: desires pp IUD at 6 week postpartum visit  - Discharge planning     Katiuska Mancini MD PGY1

## 2021-02-07 NOTE — PROGRESS NOTE ADULT - ASSESSMENT
25F with PMH of latent TB in  s/p INH who presented to the hospital for a normal, spontaneous vaginal delivery. Pulmonary consulted for RLL mass seen on CT Chest.    # RLL Lung Mass  - CT demonstrates pulmonary sequestration  - At this time, no need for biopsy or further inpatient workup. No contraindication to discharge from pulmonary standpoint.   - Patient can follow up with pulmonary as an outpatient for further review of imaging and treatment  - Please email: [email or web address] to setup an appointment prior to discharge. Include the patient's name, , MRN and contact information in the email.      Pulmonary/Sleep Clinic  [address] [place] [address]  [phone]        Richard Beaver MD  PGY-6  Pulmonary and Critical Care Fellow  Pager: [phone]      
A/P: 24yo h/o TB s/p Tx in  PPD#3 s/p  w/ CXR and f/u CTA on admission that shows lung sequestration. Patient is asymptomatic, stable and doing well post-partum.  
A/P: 26yo h/o TB in  s/p INH tx with +CXR this pregnancy PPD#1 s/p . Patient is stable, asymptomatic, and doing well post-partum. 
A/P: 26yo h/o TB s/p treatment PPD#2 s/p .  Patient is stable and doing well post-partum.

## 2021-02-07 NOTE — PROGRESS NOTE ADULT - SUBJECTIVE AND OBJECTIVE BOX
CHIEF COMPLAINT:    Interval Events:    REVIEW OF SYSTEMS:  CONSTITUTIONAL: No weakness, fevers or chills  EYES/ENT: No visual changes;  No vertigo or throat pain   NECK: No pain or stiffness  RESPIRATORY: No cough, wheezing, hemoptysis; No shortness of breath  CARDIOVASCULAR: No chest pain or palpitations  GASTROINTESTINAL: No abdominal or epigastric pain. No nausea, vomiting, or hematemesis; No diarrhea or constipation. No melena or hematochezia.  GENITOURINARY: No dysuria, frequency or hematuria  NEUROLOGICAL: No numbness or weakness  SKIN: No itching, burning, rashes, or lesions   All other review of systems is negative unless indicated above.    OBJECTIVE:  ICU Vital Signs Last 24 Hrs  T(C): 36.7 (07 Feb 2021 05:30), Max: 36.7 (06 Feb 2021 17:19)  T(F): 98.1 (07 Feb 2021 05:30), Max: 98.1 (06 Feb 2021 17:19)  HR: 72 (07 Feb 2021 05:30) (66 - 72)  BP: 99/64 (07 Feb 2021 05:30) (98/51 - 99/64)  BP(mean): --  ABP: --  ABP(mean): --  RR: 18 (07 Feb 2021 05:30) (18 - 18)  SpO2: 99% (07 Feb 2021 05:30) (99% - 99%)        CAPILLARY BLOOD GLUCOSE          PHYSICAL EXAM:  General: WN/WD NAD  Neurology: A&Ox3, nonfocal, LOYOLA x 4  Eyes: PERRLA/ EOMI, Gross vision intact  ENT/Neck: Neck supple, trachea midline, No JVD, Gross hearing intact  Respiratory: CTA B/L, No wheezing, rales, rhonchi  CV: RRR, +S1/S2, -S3/S4, no murmurs, rubs or gallops  Abdominal: Soft, NT, ND +BS, No HSM  MSK: 5/5 strength UE/LE bilaterally  Extremities: No edema, 2+ peripheral pulses  Skin: No Rashes, Hematoma, Ecchymosis  Incisions:   Tubes:    HOSPITAL MEDICATIONS:  MEDICATIONS  (STANDING):  acetaminophen   Tablet .. 975 milliGRAM(s) Oral <User Schedule>  diphtheria/tetanus/pertussis (acellular) Vaccine (ADAcel) 0.5 milliLiter(s) IntraMuscular once  ibuprofen  Tablet. 600 milliGRAM(s) Oral every 6 hours  prenatal multivitamin 1 Tablet(s) Oral daily  sodium chloride 0.9% lock flush 3 milliLiter(s) IV Push every 8 hours    MEDICATIONS  (PRN):  benzocaine 20%/menthol 0.5% Spray 1 Spray(s) Topical every 6 hours PRN for Perineal discomfort  dibucaine 1% Ointment 1 Application(s) Topical every 6 hours PRN Perineal discomfort  diphenhydrAMINE 25 milliGRAM(s) Oral every 6 hours PRN Pruritus  hydrocortisone 1% Cream 1 Application(s) Topical every 6 hours PRN Moderate Pain (4-6)  lanolin Ointment 1 Application(s) Topical every 6 hours PRN nipple soreness  magnesium hydroxide Suspension 30 milliLiter(s) Oral two times a day PRN Constipation  oxyCODONE    IR 5 milliGRAM(s) Oral every 3 hours PRN Moderate to Severe Pain (4-10)  oxyCODONE    IR 5 milliGRAM(s) Oral once PRN Moderate to Severe Pain (4-10)  pramoxine 1%/zinc 5% Cream 1 Application(s) Topical every 4 hours PRN Moderate Pain (4-6)  simethicone 80 milliGRAM(s) Chew every 4 hours PRN Gas  witch hazel Pads 1 Application(s) Topical every 4 hours PRN Perineal discomfort      LABS:    Hgb Trend: 10.7<--        Creatinine Trend:             MICROBIOLOGY:       RADIOLOGY:  [ ] Reviewed by me    PULMONARY FUNCTION TESTS:    EKG: CHIEF COMPLAINT:    Interval Events: Doing well. No supplemental O2 needs. At bedside with baby and significant other.     REVIEW OF SYSTEMS:  CONSTITUTIONAL: No weakness, fevers or chills  EYES/ENT: No visual changes;  No vertigo or throat pain   NECK: No pain or stiffness  RESPIRATORY: No cough, wheezing, hemoptysis; No shortness of breath  CARDIOVASCULAR: No chest pain or palpitations  GASTROINTESTINAL: No abdominal or epigastric pain. No nausea, vomiting, or hematemesis; No diarrhea or constipation. No melena or hematochezia.  GENITOURINARY: No dysuria, frequency or hematuria  NEUROLOGICAL: No numbness or weakness  SKIN: No itching, burning, rashes, or lesions   All other review of systems is negative unless indicated above.    OBJECTIVE:  ICU Vital Signs Last 24 Hrs  T(C): 36.7 (07 Feb 2021 05:30), Max: 36.7 (06 Feb 2021 17:19)  T(F): 98.1 (07 Feb 2021 05:30), Max: 98.1 (06 Feb 2021 17:19)  HR: 72 (07 Feb 2021 05:30) (66 - 72)  BP: 99/64 (07 Feb 2021 05:30) (98/51 - 99/64)  BP(mean): --  ABP: --  ABP(mean): --  RR: 18 (07 Feb 2021 05:30) (18 - 18)  SpO2: 99% (07 Feb 2021 05:30) (99% - 99%)        CAPILLARY BLOOD GLUCOSE          PHYSICAL EXAM:  General: WN/WD NAD  Neurology: A&Ox3, nonfocal, LOYOLA x 4  Eyes: PERRLA/ EOMI, Gross vision intact  ENT/Neck: Neck supple, trachea midline, No JVD, Gross hearing intact  Respiratory: CTA B/L, No wheezing, rales, rhonchi  CV: RRR, +S1/S2, -S3/S4, no murmurs, rubs or gallops  Abdominal: Soft, NT, ND +BS, No HSM  MSK: 5/5 strength UE/LE bilaterally  Extremities: No edema, 2+ peripheral pulses  Skin: No Rashes, Hematoma, Ecchymosis      HOSPITAL MEDICATIONS:  MEDICATIONS  (STANDING):  acetaminophen   Tablet .. 975 milliGRAM(s) Oral <User Schedule>  diphtheria/tetanus/pertussis (acellular) Vaccine (ADAcel) 0.5 milliLiter(s) IntraMuscular once  ibuprofen  Tablet. 600 milliGRAM(s) Oral every 6 hours  prenatal multivitamin 1 Tablet(s) Oral daily  sodium chloride 0.9% lock flush 3 milliLiter(s) IV Push every 8 hours    MEDICATIONS  (PRN):  benzocaine 20%/menthol 0.5% Spray 1 Spray(s) Topical every 6 hours PRN for Perineal discomfort  dibucaine 1% Ointment 1 Application(s) Topical every 6 hours PRN Perineal discomfort  diphenhydrAMINE 25 milliGRAM(s) Oral every 6 hours PRN Pruritus  hydrocortisone 1% Cream 1 Application(s) Topical every 6 hours PRN Moderate Pain (4-6)  lanolin Ointment 1 Application(s) Topical every 6 hours PRN nipple soreness  magnesium hydroxide Suspension 30 milliLiter(s) Oral two times a day PRN Constipation  oxyCODONE    IR 5 milliGRAM(s) Oral every 3 hours PRN Moderate to Severe Pain (4-10)  oxyCODONE    IR 5 milliGRAM(s) Oral once PRN Moderate to Severe Pain (4-10)  pramoxine 1%/zinc 5% Cream 1 Application(s) Topical every 4 hours PRN Moderate Pain (4-6)  simethicone 80 milliGRAM(s) Chew every 4 hours PRN Gas  witch hazel Pads 1 Application(s) Topical every 4 hours PRN Perineal discomfort      LABS:    Hgb Trend: 10.7<--        Creatinine Trend:             MICROBIOLOGY:       RADIOLOGY:  [x] Reviewed by me    CTA:   FINDINGS:    LUNGS AND AIRWAYS: Irregular right lower lung soft tissue mass measuring 8.5 x 4.0 x 7.8 cm with arterial supply arising from the aorta and venous supply extending to the right atrium via the pulmonary vein. The left lung is clear.  PLEURA: No pleural effusion.  MEDIASTINUM AND PASCUAL: No axillary or mediastinal lymphadenopathy.  VESSELS: Artery arising from the aorta which supplies the right lower lung mass/consolidation.  HEART: Heart size is normal. No pericardial effusion.  CHEST WALL AND LOWER NECK: Within normal limits.  VISUALIZED UPPER ABDOMEN: Within normal limits.  BONES: Within normal limits.    IMPRESSION: Right lower lobe pulmonary sequestration.

## 2021-02-07 NOTE — PROGRESS NOTE ADULT - SUBJECTIVE AND OBJECTIVE BOX
OB Progress Note:  PPD#2    S: 26yo h/o TB s/p Tx in  PPD#3 s/p . CXR in this pregnancy revealed right lung opacity. F/u CTA w/ and w/o contrast show pulmonary sequestration. This morning, patient feels well. Pain is well controlled. She is tolerating a regular diet and passing flatus. She is voiding spontaneously, and ambulating without difficulty. Denies fevers/chills/cough/CP/SOB. Denies lightheadedness/dizziness. Denies N/V.    O:  Vitals:   Vital Signs Last 24 Hrs  T(C): 36.7 (2021 05:30), Max: 36.7 (2021 17:19)  T(F): 98.1 (2021 05:30), Max: 98.1 (2021 17:19)  HR: 72 (2021 05:30) (66 - 72)  BP: 99/64 (2021 05:30) (98/51 - 99/64)  BP(mean): --  RR: 18 (2021 05:30) (18 - 18)  SpO2: 99% (2021 05:30) (99% - 99%)    MEDICATIONS  (STANDING):  acetaminophen   Tablet .. 975 milliGRAM(s) Oral <User Schedule>  diphtheria/tetanus/pertussis (acellular) Vaccine (ADAcel) 0.5 milliLiter(s) IntraMuscular once  ibuprofen  Tablet. 600 milliGRAM(s) Oral every 6 hours  prenatal multivitamin 1 Tablet(s) Oral daily  sodium chloride 0.9% lock flush 3 milliLiter(s) IV Push every 8 hours    MEDICATIONS  (PRN):  benzocaine 20%/menthol 0.5% Spray 1 Spray(s) Topical every 6 hours PRN for Perineal discomfort  dibucaine 1% Ointment 1 Application(s) Topical every 6 hours PRN Perineal discomfort  diphenhydrAMINE 25 milliGRAM(s) Oral every 6 hours PRN Pruritus  hydrocortisone 1% Cream 1 Application(s) Topical every 6 hours PRN Moderate Pain (4-6)  lanolin Ointment 1 Application(s) Topical every 6 hours PRN nipple soreness  magnesium hydroxide Suspension 30 milliLiter(s) Oral two times a day PRN Constipation  oxyCODONE    IR 5 milliGRAM(s) Oral every 3 hours PRN Moderate to Severe Pain (4-10)  oxyCODONE    IR 5 milliGRAM(s) Oral once PRN Moderate to Severe Pain (4-10)  pramoxine 1%/zinc 5% Cream 1 Application(s) Topical every 4 hours PRN Moderate Pain (4-6)  simethicone 80 milliGRAM(s) Chew every 4 hours PRN Gas  witch hazel Pads 1 Application(s) Topical every 4 hours PRN Perineal discomfort      Labs:  Blood type: B Positive  Rubella IgG: Positive (10-29 @ 12:40)  RPR: Negative      Physical Exam:  General: NAD  Abdomen: soft, non-tender, non-distended, fundus firm  Vaginal: Lochia wnl  Extremities: No erythema/edema

## 2021-02-08 ENCOUNTER — NON-APPOINTMENT (OUTPATIENT)
Age: 26
End: 2021-02-08

## 2021-02-09 ENCOUNTER — APPOINTMENT (OUTPATIENT)
Dept: OBGYN | Facility: HOSPITAL | Age: 26
End: 2021-02-09

## 2021-02-09 ENCOUNTER — NON-APPOINTMENT (OUTPATIENT)
Age: 26
End: 2021-02-09

## 2021-02-10 ENCOUNTER — NON-APPOINTMENT (OUTPATIENT)
Age: 26
End: 2021-02-10

## 2021-02-12 ENCOUNTER — NON-APPOINTMENT (OUTPATIENT)
Age: 26
End: 2021-02-12

## 2021-02-17 ENCOUNTER — APPOINTMENT (OUTPATIENT)
Dept: INFECTIOUS DISEASE | Facility: CLINIC | Age: 26
End: 2021-02-17

## 2021-02-19 ENCOUNTER — NON-APPOINTMENT (OUTPATIENT)
Age: 26
End: 2021-02-19

## 2021-02-25 PROBLEM — O14.90 UNSPECIFIED PRE-ECLAMPSIA, UNSPECIFIED TRIMESTER: Chronic | Status: ACTIVE | Noted: 2021-02-04

## 2021-02-25 PROBLEM — O24.419 GESTATIONAL DIABETES MELLITUS IN PREGNANCY, UNSPECIFIED CONTROL: Chronic | Status: ACTIVE | Noted: 2021-02-04

## 2021-04-01 ENCOUNTER — NON-APPOINTMENT (OUTPATIENT)
Age: 26
End: 2021-04-01

## 2021-04-01 ENCOUNTER — APPOINTMENT (OUTPATIENT)
Dept: OBGYN | Facility: HOSPITAL | Age: 26
End: 2021-04-01

## 2021-04-12 NOTE — DISCHARGE NOTE OB - LAUNCH MEDICATION RECONCILIATION
Depression
<<-----Click here for Discharge Medication Review

## 2022-01-14 NOTE — DISCHARGE NOTE OB - REST! DO NOT DO HEAVY HOUSEWORK, LIFTING OR STRENOUS EXERCISE FOR TWO WEEKS
Patient notified of results below. Patient verbalizes understanding and has no further questions.    Statement Selected

## 2022-09-30 NOTE — OB RN PATIENT PROFILE - STEPS TO INITIATE SKIN TO SKIN CONTACT DISCUSSED, INCLUDING INITIATING FATHER SKIN TO SKIN IF POSSIBLE.
Caller: Alaina Hartman    Relationship: Self    Best call back number: [phone]    What is the best time to reach you: ANYTIME     CAN LEAVE VOICEMAIL IF UNABLE TO REACH ALSO AND WILL CALL BACK IF NEEDED.     Who are you requesting to speak with (clinical staff, provider,  specific staff member): NOHEMY RN         What was the call regarding: CALLING BACK TO GIVE NOHEMY INFO ON ORAL SURGEON IS GOING TO SEE     WHICH IS DR IBANEZ IN [place] , THERE PHONE  NUMBER [phone]    REFERRING WAS   [facility] [phone] OPTION 2         Do you require a callback: NO UNLESS QUESTIONS OR CONCERNS            Statement Selected

## 2022-10-17 ENCOUNTER — EMERGENCY (EMERGENCY)
Facility: HOSPITAL | Age: 27
LOS: 1 days | Discharge: HOME | End: 2022-10-17
Attending: EMERGENCY MEDICINE | Admitting: EMERGENCY MEDICINE

## 2022-10-17 VITALS
HEART RATE: 58 BPM | OXYGEN SATURATION: 100 % | SYSTOLIC BLOOD PRESSURE: 99 MMHG | TEMPERATURE: 98 F | DIASTOLIC BLOOD PRESSURE: 72 MMHG | RESPIRATION RATE: 18 BRPM | HEIGHT: 62 IN

## 2022-10-17 VITALS
DIASTOLIC BLOOD PRESSURE: 80 MMHG | RESPIRATION RATE: 17 BRPM | HEART RATE: 71 BPM | SYSTOLIC BLOOD PRESSURE: 100 MMHG | OXYGEN SATURATION: 100 % | TEMPERATURE: 98 F

## 2022-10-17 LAB
ALBUMIN SERPL ELPH-MCNC: 4.6 G/DL — SIGNIFICANT CHANGE UP (ref 3.3–5)
ALP SERPL-CCNC: 94 U/L — SIGNIFICANT CHANGE UP (ref 40–120)
ALT FLD-CCNC: 38 U/L — HIGH (ref 4–33)
ANION GAP SERPL CALC-SCNC: 16 MMOL/L — HIGH (ref 7–14)
APPEARANCE UR: CLEAR — SIGNIFICANT CHANGE UP
AST SERPL-CCNC: 22 U/L — SIGNIFICANT CHANGE UP (ref 4–32)
B PERT DNA SPEC QL NAA+PROBE: SIGNIFICANT CHANGE UP
B PERT+PARAPERT DNA PNL SPEC NAA+PROBE: SIGNIFICANT CHANGE UP
BASOPHILS # BLD AUTO: 0.03 K/UL — SIGNIFICANT CHANGE UP (ref 0–0.2)
BASOPHILS NFR BLD AUTO: 0.3 % — SIGNIFICANT CHANGE UP (ref 0–2)
BILIRUB SERPL-MCNC: <0.2 MG/DL — SIGNIFICANT CHANGE UP (ref 0.2–1.2)
BILIRUB UR-MCNC: NEGATIVE — SIGNIFICANT CHANGE UP
BLOOD GAS VENOUS COMPREHENSIVE RESULT: SIGNIFICANT CHANGE UP
BORDETELLA PARAPERTUSSIS (RAPRVP): SIGNIFICANT CHANGE UP
BUN SERPL-MCNC: 10 MG/DL — SIGNIFICANT CHANGE UP (ref 7–23)
C PNEUM DNA SPEC QL NAA+PROBE: SIGNIFICANT CHANGE UP
CALCIUM SERPL-MCNC: 10.3 MG/DL — SIGNIFICANT CHANGE UP (ref 8.4–10.5)
CHLORIDE SERPL-SCNC: 102 MMOL/L — SIGNIFICANT CHANGE UP (ref 98–107)
CO2 SERPL-SCNC: 20 MMOL/L — LOW (ref 22–31)
COLOR SPEC: YELLOW — SIGNIFICANT CHANGE UP
CREAT SERPL-MCNC: 0.38 MG/DL — LOW (ref 0.5–1.3)
DIFF PNL FLD: NEGATIVE — SIGNIFICANT CHANGE UP
EGFR: 143 ML/MIN/1.73M2 — SIGNIFICANT CHANGE UP
EOSINOPHIL # BLD AUTO: 0.17 K/UL — SIGNIFICANT CHANGE UP (ref 0–0.5)
EOSINOPHIL NFR BLD AUTO: 1.7 % — SIGNIFICANT CHANGE UP (ref 0–6)
FLUAV SUBTYP SPEC NAA+PROBE: SIGNIFICANT CHANGE UP
FLUBV RNA SPEC QL NAA+PROBE: SIGNIFICANT CHANGE UP
GLUCOSE SERPL-MCNC: 104 MG/DL — HIGH (ref 70–99)
GLUCOSE UR QL: NEGATIVE — SIGNIFICANT CHANGE UP
HADV DNA SPEC QL NAA+PROBE: SIGNIFICANT CHANGE UP
HCG SERPL-ACNC: <5 MIU/ML — SIGNIFICANT CHANGE UP
HCOV 229E RNA SPEC QL NAA+PROBE: SIGNIFICANT CHANGE UP
HCOV HKU1 RNA SPEC QL NAA+PROBE: SIGNIFICANT CHANGE UP
HCOV NL63 RNA SPEC QL NAA+PROBE: SIGNIFICANT CHANGE UP
HCOV OC43 RNA SPEC QL NAA+PROBE: SIGNIFICANT CHANGE UP
HCT VFR BLD CALC: 37 % — SIGNIFICANT CHANGE UP (ref 34.5–45)
HGB BLD-MCNC: 11.7 G/DL — SIGNIFICANT CHANGE UP (ref 11.5–15.5)
HMPV RNA SPEC QL NAA+PROBE: SIGNIFICANT CHANGE UP
HPIV1 RNA SPEC QL NAA+PROBE: SIGNIFICANT CHANGE UP
HPIV2 RNA SPEC QL NAA+PROBE: SIGNIFICANT CHANGE UP
HPIV3 RNA SPEC QL NAA+PROBE: SIGNIFICANT CHANGE UP
HPIV4 RNA SPEC QL NAA+PROBE: SIGNIFICANT CHANGE UP
IANC: 6.27 K/UL — SIGNIFICANT CHANGE UP (ref 1.8–7.4)
IMM GRANULOCYTES NFR BLD AUTO: 0.6 % — SIGNIFICANT CHANGE UP (ref 0–0.9)
KETONES UR-MCNC: NEGATIVE — SIGNIFICANT CHANGE UP
LEUKOCYTE ESTERASE UR-ACNC: NEGATIVE — SIGNIFICANT CHANGE UP
LIDOCAIN IGE QN: 23 U/L — SIGNIFICANT CHANGE UP (ref 7–60)
LYMPHOCYTES # BLD AUTO: 29.9 % — SIGNIFICANT CHANGE UP (ref 13–44)
LYMPHOCYTES # BLD AUTO: 3.03 K/UL — SIGNIFICANT CHANGE UP (ref 1–3.3)
M PNEUMO DNA SPEC QL NAA+PROBE: SIGNIFICANT CHANGE UP
MCHC RBC-ENTMCNC: 25.8 PG — LOW (ref 27–34)
MCHC RBC-ENTMCNC: 31.6 GM/DL — LOW (ref 32–36)
MCV RBC AUTO: 81.7 FL — SIGNIFICANT CHANGE UP (ref 80–100)
MONOCYTES # BLD AUTO: 0.59 K/UL — SIGNIFICANT CHANGE UP (ref 0–0.9)
MONOCYTES NFR BLD AUTO: 5.8 % — SIGNIFICANT CHANGE UP (ref 2–14)
NEUTROPHILS # BLD AUTO: 6.27 K/UL — SIGNIFICANT CHANGE UP (ref 1.8–7.4)
NEUTROPHILS NFR BLD AUTO: 61.7 % — SIGNIFICANT CHANGE UP (ref 43–77)
NITRITE UR-MCNC: NEGATIVE — SIGNIFICANT CHANGE UP
NRBC # BLD: 0 /100 WBCS — SIGNIFICANT CHANGE UP (ref 0–0)
NRBC # FLD: 0 K/UL — SIGNIFICANT CHANGE UP (ref 0–0)
PH UR: 6.5 — SIGNIFICANT CHANGE UP (ref 5–8)
PLATELET # BLD AUTO: 420 K/UL — HIGH (ref 150–400)
POTASSIUM SERPL-MCNC: 4.3 MMOL/L — SIGNIFICANT CHANGE UP (ref 3.5–5.3)
POTASSIUM SERPL-SCNC: 4.3 MMOL/L — SIGNIFICANT CHANGE UP (ref 3.5–5.3)
PROT SERPL-MCNC: 7.8 G/DL — SIGNIFICANT CHANGE UP (ref 6–8.3)
PROT UR-MCNC: ABNORMAL
RAPID RVP RESULT: SIGNIFICANT CHANGE UP
RBC # BLD: 4.53 M/UL — SIGNIFICANT CHANGE UP (ref 3.8–5.2)
RBC # FLD: 13.8 % — SIGNIFICANT CHANGE UP (ref 10.3–14.5)
RSV RNA SPEC QL NAA+PROBE: SIGNIFICANT CHANGE UP
RV+EV RNA SPEC QL NAA+PROBE: SIGNIFICANT CHANGE UP
SARS-COV-2 RNA SPEC QL NAA+PROBE: SIGNIFICANT CHANGE UP
SODIUM SERPL-SCNC: 138 MMOL/L — SIGNIFICANT CHANGE UP (ref 135–145)
SP GR SPEC: 1.03 — SIGNIFICANT CHANGE UP (ref 1.01–1.05)
UROBILINOGEN FLD QL: SIGNIFICANT CHANGE UP
WBC # BLD: 10.15 K/UL — SIGNIFICANT CHANGE UP (ref 3.8–10.5)
WBC # FLD AUTO: 10.15 K/UL — SIGNIFICANT CHANGE UP (ref 3.8–10.5)

## 2022-10-17 PROCEDURE — 99285 EMERGENCY DEPT VISIT HI MDM: CPT

## 2022-10-17 PROCEDURE — 71260 CT THORAX DX C+: CPT | Mod: 26,MA

## 2022-10-17 RX ORDER — ACETAMINOPHEN 500 MG
1000 TABLET ORAL ONCE
Refills: 0 | Status: DISCONTINUED | OUTPATIENT
Start: 2022-10-17 | End: 2022-10-21

## 2022-10-17 RX ORDER — SODIUM CHLORIDE 9 MG/ML
1000 INJECTION, SOLUTION INTRAVENOUS ONCE
Refills: 0 | Status: COMPLETED | OUTPATIENT
Start: 2022-10-17 | End: 2022-10-17

## 2022-10-17 RX ADMIN — SODIUM CHLORIDE 1000 MILLILITER(S): 9 INJECTION, SOLUTION INTRAVENOUS at 16:28

## 2022-10-17 NOTE — ED PROVIDER NOTE - PHYSICAL EXAMINATION
VS:  unremarkable except BP soft    GEN - NAD;  malaise, feels warm;   A+O x3   HEAD - NC/AT     ENT - PEERL, EOMI, mucous membranes    moist , no discharge      NECK: Neck supple, non-tender without lymphadenopathy, no masses, no JVD  PULM - CTA b/l,  symmetric breath sounds  COR -  normal heart sounds    ABD - , ND, NT, soft,  BACK - no CVA tenderness, nontender spine     EXTREMS - no edema, no deformity, warm and well perfused    SKIN - no rash    or bruising      NEUROLOGIC - alert, face symmetric, speech fluent, sensation nl, motor no focal deficit.

## 2022-10-17 NOTE — ED PROVIDER NOTE - OBJECTIVE STATEMENT
27F p/w R upper flank pain x 2-3 days.  Started at rest.  Tried tylenol w/o improvement.  Better with putting pressure on it.  Standing for too long makes it worse.  Pulmonary sequestration found about a year ago, seen by pulm inpt, didn't follow up outpt.  No fever or cough. No V, D, abd pain.  Pt does endorse sweating.  Feels hot.  Pt is s/p BCG vaccine.  PMHX denies.  PSHX denies.  NKA.  No T.  Pt feels warm.  Plan check rectal temp, labs, CT chest with IV contrast eval for lung abscess, pulm eval.  If no acute findings on CT after pulm discussion would consider d/c.  Pt already took tylenol w/o much improvement, but not recently will rx ofirmev.    VS:  unremarkable except BP soft    GEN - NAD;  malaise, feels warm;   A+O x3   HEAD - NC/AT     ENT - PEERL, EOMI, mucous membranes    moist , no discharge      NECK: Neck supple, non-tender without lymphadenopathy, no masses, no JVD  PULM - CTA b/l,  symmetric breath sounds  COR -  normal heart sounds    ABD - , ND, NT, soft,  BACK - no CVA tenderness, nontender spine     EXTREMS - no edema, no deformity, warm and well perfused    SKIN - no rash    or bruising      NEUROLOGIC - alert, face symmetric, speech fluent, sensation nl, motor no focal deficit.

## 2022-10-17 NOTE — ED ADULT NURSE REASSESSMENT NOTE - NS ED NURSE REASSESS COMMENT FT1
patient discharged per MD Bloch, IV line pulled. patient well appearing, no acute distress noted. patient offers no complaints at this time. all safety maintained at this time.

## 2022-10-17 NOTE — ED PROVIDER NOTE - NSICDXPASTMEDICALHX_GEN_ALL_CORE_FT
PAST MEDICAL HISTORY:  Gestational diabetes mellitus (GDM) during first pregnancy     Preeclampsia during first pregnancy    Tuberculosis

## 2022-10-17 NOTE — ED PROVIDER NOTE - NSFOLLOWUPINSTRUCTIONS_ED_ALL_ED_FT
You will be called by pulmonary tomorrow for a follow up appointment   Return for shortness of breath or new or concerning symptoms.      Advance activity as tolerated.  Continue all previously prescribed medications as directed unless otherwise instructed.  Follow up with your primary care physician in 48-72 hours- bring copies of your results.  Return to the ER for worsening or persistent symptoms, and/or ANY NEW OR CONCERNING SYMPTOMS. If you have issues obtaining follow up, please call: [phone] to obtain a doctor or specialist who takes your insurance in your area.  You may call [phone] to make an appointment with the internal medicine clinic.

## 2022-10-17 NOTE — ED PROVIDER NOTE - CLINICAL SUMMARY MEDICAL DECISION MAKING FREE TEXT BOX
27F p/w R upper flank pain x 2-3 days.  Started at rest.  Tried tylenol w/o improvement.  Better with putting pressure on it.  Standing for too long makes it worse.  Pulmonary sequestration found about a year ago, seen by pulm inpt, didn't follow up outpt.  No fever or cough. No V, D, abd pain.  Pt does endorse sweating.  Feels hot.  Pt is s/p BCG vaccine.  PMHX denies.  PSHX denies.  NKA.  No T.  Pt feels warm.  Plan check rectal temp, labs, CT chest with IV contrast eval for lung abscess, pulm eval.  If no acute findings on CT after pulm discussion would consider d/c.  Pt already took tylenol w/o much improvement, but not recently will rx ofirmev.

## 2022-10-18 NOTE — CONSULT NOTE ADULT - SUBJECTIVE AND OBJECTIVE BOX
CHIEF COMPLAINT: flank pain     HPI:    Nani Frank is a 27 year old female w/pulmonary sequestration who presented to [facility] ED 10/17/22 for flank pain of 2-3 days duration.      On exam patient states..    PAST MEDICAL & SURGICAL HISTORY:  Gestational diabetes mellitus (GDM) during first pregnancy      Preeclampsia  during first pregnancy      Tuberculosis      No significant past surgical history          FAMILY HISTORY:      SOCIAL HISTORY:  Smoking: [ ] Never Smoked [ ] Former Smoker (__ packs x ___ years) [ ] Current Smoker  (__ packs x ___ years)  Substance Use: [ ] Never Used [ ] Used ____  EtOH Use:  Marital Status: [ ] Single [ ]  [ ]  [ ]   Sexual History:   Occupation:  Recent Travel:  Country of Birth:  Advance Directives:    Allergies    No Known Allergies    Intolerances        HOME MEDICATIONS:  Home Medications:  acetaminophen 325 mg oral tablet: 3 tab(s) orally  (2021 08:49)  ibuprofen 600 mg oral tablet: 1 tab(s) orally every 6 hours (2021 08:49)  Iron 100 Plus oral tablet: 350 milligram(s) orally 2 times a day (2021 07:13)  PNV Prenatal oral tablet: 1 tab(s) orally once a day (2021 07:13)  vitamin D:  (2021 07:13)      REVIEW OF SYSTEMS:  Constitutional: [ ] negative [ ] fevers [ ] chills [ ] weight loss [ ] weight gain  HEENT: [ ] negative [ ] dry eyes [ ] eye irritation [ ] postnasal drip [ ] nasal congestion  CV: [ ] negative  [ ] chest pain [ ] orthopnea [ ] palpitations [ ] murmur  Resp: [ ] negative [ ] cough [ ] shortness of breath [ ] dyspnea [ ] wheezing [ ] sputum [ ] hemoptysis  GI: [ ] negative [ ] nausea [ ] vomiting [ ] diarrhea [ ] constipation [ ] abd pain [ ] dysphagia   : [ ] negative [ ] dysuria [ ] nocturia [ ] hematuria [ ] increased urinary frequency  Musculoskeletal: [ ] negative [ ] back pain [ ] myalgias [ ] arthralgias [ ] fracture  Skin: [ ] negative [ ] rash [ ] itch  Neurological: [ ] negative [ ] headache [ ] dizziness [ ] syncope [ ] weakness [ ] numbness  Psychiatric: [ ] negative [ ] anxiety [ ] depression  Endocrine: [ ] negative [ ] diabetes [ ] thyroid problem  Hematologic/Lymphatic: [ ] negative [ ] anemia [ ] bleeding problem  Allergic/Immunologic: [ ] negative [ ] itchy eyes [ ] nasal discharge [ ] hives [ ] angioedema  [ ] All other systems negative  [ ] Unable to assess ROS because ________    OBJECTIVE:  ICU Vital Signs Last 24 Hrs  T(C): 36.9 (17 Oct 2022 17:53), Max: 36.9 (17 Oct 2022 17:53)  T(F): 98.5 (17 Oct 2022 17:53), Max: 98.5 (17 Oct 2022 17:53)  HR: 71 (17 Oct 2022 17:53) (58 - 71)  BP: 100/80 (17 Oct 2022 17:53) (99/72 - 100/80)  BP(mean): --  ABP: --  ABP(mean): --  RR: 17 (17 Oct 2022 17:53) (17 - 18)  SpO2: 100% (17 Oct 2022 17:53) (100% - 100%)    O2 Parameters below as of 17 Oct 2022 17:53  Patient On (Oxygen Delivery Method): room air              CAPILLARY BLOOD GLUCOSE          PHYSICAL EXAM:  General:  no acute distress   HEENT: atraumatic, normocephalic   Respiratory: LCTAB, no use of accessory muscles, no wheezing   Cardiovascular:  RRR, no rubs, gallops, murmurs   Abdomen: soft, non-tender, non-distended   Extremities: no edema   Skin: no rashes/cyanosis   Neurological: no gross/focal deficits appreciated   Psychiatry: appropriate     LINES:     HOSPITAL MEDICATIONS:  Standing Meds:  acetaminophen   IVPB .. 1000 milliGRAM(s) IV Intermittent once      PRN Meds:      LABS:                        11.7   10.15 )-----------( 420      ( 17 Oct 2022 15:48 )             37.0     Hgb Trend: 11.7<--  10-17    138  |  102  |  10  ----------------------------<  104<H>  4.3   |  20<L>  |  0.38<L>    Ca    10.3      17 Oct 2022 15:48    TPro  7.8  /  Alb  4.6  /  TBili  <0.2  /  DBili  x   /  AST  22  /  ALT  38<H>  /  AlkPhos  94  10-17    Creatinine Trend: 0.38<--    Urinalysis Basic - ( 17 Oct 2022 15:00 )    Color: Yellow / Appearance: Clear / S.028 / pH: x  Gluc: x / Ketone: Negative  / Bili: Negative / Urobili: <2 mg/dL   Blood: x / Protein: Trace / Nitrite: Negative   Leuk Esterase: Negative / RBC: x / WBC x   Sq Epi: x / Non Sq Epi: x / Bacteria: x        Venous Blood Gas:  10-17 @ 15:47  7.33/53/21/28/24.5  VBG Lactate: 1.5      MICROBIOLOGY:       RADIOLOGY:  [ ] Reviewed and interpreted by me    PULMONARY FUNCTION TESTS:    EKG:

## 2022-10-18 NOTE — CONSULT NOTE ADULT - ASSESSMENT
Nani Frank is a 27 year old female w/pulmonary sequestration who presented to [facility] ED 10/17/22 for flank pain of 2-3 days duration.      #Pulmonary Sequestration  - patient w/history of pulmonary sequestration, presenting on CT can 2/2021  - now presenting with complains of flank pain w/repeat CT chest showing cystic lesion with air fluid levels concerning for infection, also concern that patient's symptoms related to sequestration    Recommendations:    Note incomplete    To be discussed w/Dr. Johnathan Duke PGY5  [phone]

## 2022-10-19 LAB
-  AMPICILLIN: SIGNIFICANT CHANGE UP
-  AMPICILLIN: SIGNIFICANT CHANGE UP
-  CIPROFLOXACIN: SIGNIFICANT CHANGE UP
-  CIPROFLOXACIN: SIGNIFICANT CHANGE UP
-  LEVOFLOXACIN: SIGNIFICANT CHANGE UP
-  LEVOFLOXACIN: SIGNIFICANT CHANGE UP
-  TETRACYCLINE: SIGNIFICANT CHANGE UP
-  TETRACYCLINE: SIGNIFICANT CHANGE UP
-  VANCOMYCIN: SIGNIFICANT CHANGE UP
-  VANCOMYCIN: SIGNIFICANT CHANGE UP
METHOD TYPE: SIGNIFICANT CHANGE UP
METHOD TYPE: SIGNIFICANT CHANGE UP

## 2022-10-20 LAB
CULTURE RESULTS: SIGNIFICANT CHANGE UP
ORGANISM # SPEC MICROSCOPIC CNT: SIGNIFICANT CHANGE UP
SPECIMEN SOURCE: SIGNIFICANT CHANGE UP

## 2022-10-23 NOTE — ED POST DISCHARGE NOTE - RESULT SUMMARY
UCX: Few enterococus raffinos, rare enterococcus faecalis, few strep B. No antibiotic listed in ED provider note or prescription writer at time of discharge. Patient contact # [phone] and  messages left with Call Back  P.A. number and hours for return call back.

## 2022-11-02 ENCOUNTER — APPOINTMENT (OUTPATIENT)
Dept: PULMONOLOGY | Facility: CLINIC | Age: 27
End: 2022-11-02

## 2022-11-02 VITALS
HEIGHT: 62 IN | HEART RATE: 83 BPM | WEIGHT: 166 LBS | SYSTOLIC BLOOD PRESSURE: 111 MMHG | TEMPERATURE: 98.4 F | OXYGEN SATURATION: 98 % | BODY MASS INDEX: 30.55 KG/M2 | DIASTOLIC BLOOD PRESSURE: 76 MMHG

## 2022-11-02 PROCEDURE — 99204 OFFICE O/P NEW MOD 45 MIN: CPT

## 2022-11-02 RX ORDER — CEPHALEXIN 250 MG/1
250 CAPSULE ORAL 3 TIMES DAILY
Qty: 21 | Refills: 0 | Status: COMPLETED | COMMUNITY
Start: 2020-11-02 | End: 2022-11-02

## 2022-11-02 RX ORDER — NYSTATIN 100000 [USP'U]/G
100000 CREAM TOPICAL TWICE DAILY
Qty: 1 | Refills: 2 | Status: COMPLETED | COMMUNITY
Start: 2020-10-29 | End: 2022-11-02

## 2022-11-02 RX ORDER — MULTIVIT-MIN/IRON/FOLIC ACID/K 18-600-40
50 MCG CAPSULE ORAL
Qty: 30 | Refills: 4 | Status: COMPLETED | COMMUNITY
Start: 2020-12-01 | End: 2022-11-02

## 2022-11-02 RX ORDER — TRIAMCINOLONE ACETONIDE 1 MG/G
0.1 CREAM TOPICAL
Qty: 1 | Refills: 2 | Status: COMPLETED | COMMUNITY
Start: 2020-10-29 | End: 2022-11-02

## 2022-11-02 NOTE — PHYSICAL EXAM
[No Acute Distress] : no acute distress [Normal Oropharynx] : normal oropharynx [No Neck Mass] : no neck mass [Normal Rate/Rhythm] : normal rate/rhythm [Normal S1, S2] : normal s1, s2 [No Murmurs] : no murmurs [No Resp Distress] : no resp distress [Clear to Auscultation Bilaterally] : clear to auscultation bilaterally [No Abnormalities] : no abnormalities [Benign] : benign [Normal Gait] : normal gait [No Clubbing] : no clubbing [No Cyanosis] : no cyanosis [FROM] : FROM [Normal Color/ Pigmentation] : normal color/ pigmentation [No Focal Deficits] : no focal deficits [Oriented x3] : oriented x3 [Normal Affect] : normal affect [Well Nourished] : well nourished [Normal Appearance] : normal appearance [Well Developed] : well developed [No JVD] : no jvd [Normal Pulses] : normal pulses [No Edema] : no edema [No Acc Muscle Use] : no acc muscle use [Not Tender] : not tender [Soft] : soft [TextBox_99] : +pain in the right mid back

## 2022-11-02 NOTE — REVIEW OF SYSTEMS
[Chills] : chills [Cough] : cough [Back Pain] : back pain [Negative] : Endocrine [TextBox_94] : right mid back pain

## 2022-11-02 NOTE — CONSULT LETTER
[Dear  ___] : Dear  [unfilled], [Consult Letter:] : I had the pleasure of evaluating your patient, [unfilled]. [Please see my note below.] : Please see my note below. [Consult Closing:] : Thank you very much for allowing me to participate in the care of this patient.  If you have any questions, please do not hesitate to contact me. [Sincerely,] : Sincerely, [FreeTextEntry2] : Dr. Heather Antonio MD\par Fax: [phone]\par  [FreeTextEntry3] : Kendall Smith MD\par Attending Physician in Pulmonary & Critical Care Medicine\par  of Medicine\par [employer] [facility]

## 2022-11-02 NOTE — HISTORY OF PRESENT ILLNESS
[TextBox_4] : Ms. 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 She was found to have UTI with Enterococcus. Found on CT chest to have RLL pulmonary sequestration that is cystic in appearance with air fluid level and has blood supply arising from the aorta. She reports chills and diaphoresis. No fevers. Reports pain in the right middle back. Has occasional cough, but no sputum production. No recent weight loss.

## 2022-11-02 NOTE — ASSESSMENT
[FreeTextEntry1] : Ms. 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 UA was negative, although urine culture with multiple organisms including Enterococcus, likely contaminant. She was given IVF and acetaminophen and discharged home. Found on CT chest to have RLL pulmonary sequestration that is cystic in appearance with air fluid level and has blood supply arising from the aorta. She reports chills and diaphoresis. No fevers. Reports pain in the right middle back. Has occasional cough, but no sputum production. No recent weight loss. \par \par I am concerned that the RLL pulmonary sequestration is now infected given the cystic appearance and air fluid level along with the right mid back pain and associated chills/diaphoresis. Will give Augmentin 875 mg twice daily for 2 weeks, then repeat CT chest to assess for resolution of the air fluid level and improvement in back pain. \par \par I believe she will need surgical resection. Will refer to thoracic surgery. Needs complete PFTs in the meantime.\par \par She is up to date with COVID-19 vaccinations. Influenza vaccine administered in left arm todaytod\par \par Follow-up in 2 weeks or sooner prn.

## 2022-11-18 ENCOUNTER — APPOINTMENT (OUTPATIENT)
Dept: CT IMAGING | Facility: IMAGING CENTER | Age: 27
End: 2022-11-18

## 2022-11-18 ENCOUNTER — OUTPATIENT (OUTPATIENT)
Dept: OUTPATIENT SERVICES | Facility: HOSPITAL | Age: 27
LOS: 1 days | End: 2022-11-18
Payer: MEDICAID

## 2022-11-18 DIAGNOSIS — Q33.2 SEQUESTRATION OF LUNG: ICD-10-CM

## 2022-11-18 PROCEDURE — 71250 CT THORAX DX C-: CPT

## 2022-11-18 PROCEDURE — 71250 CT THORAX DX C-: CPT | Mod: 26

## 2022-11-29 ENCOUNTER — APPOINTMENT (OUTPATIENT)
Dept: PULMONOLOGY | Facility: CLINIC | Age: 27
End: 2022-11-29

## 2022-11-29 VITALS
SYSTOLIC BLOOD PRESSURE: 111 MMHG | HEART RATE: 76 BPM | TEMPERATURE: 98.6 F | HEIGHT: 62 IN | BODY MASS INDEX: 30.18 KG/M2 | DIASTOLIC BLOOD PRESSURE: 75 MMHG | WEIGHT: 164 LBS | OXYGEN SATURATION: 98 % | RESPIRATION RATE: 17 BRPM

## 2022-11-29 PROCEDURE — 99214 OFFICE O/P EST MOD 30 MIN: CPT

## 2022-11-29 NOTE — HISTORY OF PRESENT ILLNESS
[TextBox_4] : Ms. 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 She was recently in the ED for right flank pain. She was found to have UTI with Enterococcus. Also found on CT chest to have RLL pulmonary sequestration that is cystic in appearance with air fluid level and has blood supply arising from the aorta. Given that she was experiencing chills and diaphoresis last visit with pain in the R middle back and occasional cough, she was given Augmentin for 2 weeks. Cough is now resolved and the flank pain is significantly improved.\par \par Repeat CT chest in Nov 2022 with re-demonstration of right lower lobe paraspinal opacity with cystic components compatible with area of previously described sequestration demonstrates slight clearing of previously seen debris/fluid within the dominant cystic portion, but is otherwise unchanged when compared to prior chest CT of October 17, 2022. There is marked interval improvement/clearing of the opacified area of sequestration when compared to 2/6/2021. No lymphadenopathy or pleural effusion.\par

## 2022-11-29 NOTE — ASSESSMENT
[FreeTextEntry1] : Ms. 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 She was recently in the ED for right flank pain. She was found to have UTI with Enterococcus. Also found on CT chest to have RLL pulmonary sequestration that is cystic in appearance with air fluid level and has blood supply arising from the aorta. Given that she was experiencing chills and diaphoresis last visit with pain in the R middle back and occasional cough, she was given Augmentin for 2 weeks. Cough is now resolved and the flank pain is significantly improved.\par \par Repeat CT chest in Nov 2022 with re-demonstration of right lower lobe paraspinal opacity with cystic components compatible with area of previously described sequestration demonstrates slight clearing of previously seen debris/fluid within the dominant cystic portion, but is otherwise unchanged when compared to prior chest CT of October 17, 2022. There is marked interval improvement/clearing of the opacified area of sequestration when compared to 2/6/2021. No lymphadenopathy or pleural effusion.\par \par I reviewed the images of the repeat CT chest with her today. I am concerned that she can develop recurrent infection, which can eventually involve the adjacent normal lung parenchyma. I recommend evaluation with thoracic surgery to discuss possible surgical resection. Can also consider IR embolization, will discuss with IR. Pending complete PFTs in the meantime.\par \par She is up to date with COVID-19 and influenza vaccinations. \par \par Follow-up after thoracic surgery evaluation.

## 2022-11-29 NOTE — PHYSICAL EXAM
[No Acute Distress] : no acute distress [Well Nourished] : well nourished [Well Developed] : well developed [Normal Oropharynx] : normal oropharynx [Normal Appearance] : normal appearance [No Neck Mass] : no neck mass [No JVD] : no jvd [Normal Rate/Rhythm] : normal rate/rhythm [Normal Pulses] : normal pulses [Normal S1, S2] : normal s1, s2 [No Murmurs] : no murmurs [No Resp Distress] : no resp distress [No Acc Muscle Use] : no acc muscle use [Clear to Auscultation Bilaterally] : clear to auscultation bilaterally [No Abnormalities] : no abnormalities [Benign] : benign [Not Tender] : not tender [Soft] : soft [Normal Gait] : normal gait [No Clubbing] : no clubbing [No Cyanosis] : no cyanosis [No Edema] : no edema [FROM] : FROM [Normal Color/ Pigmentation] : normal color/ pigmentation [No Focal Deficits] : no focal deficits [Oriented x3] : oriented x3 [Normal Affect] : normal affect [Cranial Nerves Intact] : cranial nerves intact [No Motor Deficits] : no motor deficits

## 2022-12-06 ENCOUNTER — APPOINTMENT (OUTPATIENT)
Dept: THORACIC SURGERY | Facility: CLINIC | Age: 27
End: 2022-12-06

## 2022-12-06 VITALS
HEIGHT: 62 IN | BODY MASS INDEX: 30.18 KG/M2 | WEIGHT: 164 LBS | OXYGEN SATURATION: 97 % | SYSTOLIC BLOOD PRESSURE: 107 MMHG | HEART RATE: 75 BPM | DIASTOLIC BLOOD PRESSURE: 74 MMHG

## 2022-12-06 DIAGNOSIS — Z86.2 PERSONAL HISTORY OF DISEASES OF THE BLOOD AND BLOOD-FORMING ORGANS AND CERTAIN DISORDERS INVOLVING THE IMMUNE MECHANISM: ICD-10-CM

## 2022-12-06 PROCEDURE — 99205 OFFICE O/P NEW HI 60 MIN: CPT

## 2022-12-07 DIAGNOSIS — Z01.818 ENCOUNTER FOR OTHER PREPROCEDURAL EXAMINATION: ICD-10-CM

## 2022-12-08 PROBLEM — Z86.2 HISTORY OF ANEMIA: Status: RESOLVED | Noted: 2022-12-08 | Resolved: 2022-12-08

## 2022-12-08 RX ORDER — AMOXICILLIN AND CLAVULANATE POTASSIUM 875; 125 MG/1; MG/1
875-125 TABLET, COATED ORAL
Qty: 28 | Refills: 0 | Status: COMPLETED | COMMUNITY
Start: 2022-11-02 | End: 2022-12-08

## 2022-12-08 NOTE — HISTORY OF PRESENT ILLNESS
[FreeTextEntry1] : Ms. 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 October, 2022 reported to ED for right flank pain. Found to have UTI. During workup, CT chest showing  RLL pulmonary sequestration that is cystic in appearance with air fluid level and has blood supply arising from the aorta. Discharged on a course of Augumentin. Repeat CT Chest in November, showing Right lower lobe pulmonary sequestration with mild internal debris/opacification majority of which is unchanged since October 17, 2022. However,  there is significant clearing with marked improved aeration of the sequestration when compared to 2/6/2021.\par \par Followed up with Dr. Smith, concern for  developing recurrent infection, which can eventually involve the adjacent normal lung parenchyma. Discussed with patient possible surgical resection vs IR embolization. Referred to office for further evaluation and treatment. \par \par CT Chest on 11/18/22:\par - Re-demonstrated RLL paraspinal opacity with cystic components compatible with area of previously described sequestration demonstrates slight clearing of previously seen debris/fluid within the dominant cystic portion, but is otherwise unchanged when compared to prior chest CT of October 17, 2022.\par - There is marked interval improvement/clearing of the opacified area of sequestration when compared to 2/6/2021.\par - Diffuse hepatic steatosis and a partially imaged liver.\par - Incidental left humeral head 5 mm bone island. No acute osseous abnormality\par \par Today, patient denies worsening SOB, chest pain, cough, hemoptysis, fever, chills, night sweats, lightheadedness or dizziness.\par \par

## 2022-12-08 NOTE — DATA REVIEWED
[FreeTextEntry1] : Independently reviewed the following:\par - CT Chest on 11/18/22\par - CT Chest on 10/17/22

## 2022-12-08 NOTE — ASSESSMENT
[FreeTextEntry1] : Ms. 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 October, 2022 reported to ED for right flank pain. Found to have UTI. During workup, CT chest showing  RLL pulmonary sequestration that is cystic in appearance with air fluid level and has blood supply arising from the aorta. Discharged on a course of Augumentin. Repeat CT Chest in November, showing Right lower lobe pulmonary sequestration with mild internal debris/opacification majority of which is unchanged since October 17, 2022. However,  there is significant clearing with marked improved aeration of the sequestration when compared to 2/6/2021.\par \par Followed up with Dr. Smith, concern for  developing recurrent infection, which can eventually involve the adjacent normal lung parenchyma. Discussed with patient possible surgical resection vs IR embolization. Referred to office for further evaluation and treatment. \par \par I have independently reviewed the medical records and imaging at the time of this office consultation, and discussed the following interpretations with the patient:\par - Discussed Bronch, Right VATS resection of sequestration; lung resection with harmonic for diagnostic and therapeutic purposes. Risks, benefits and alternatives explained to patient; All questions answered and patient agrees to proceed with surgery. \par - PST and Medical clearance required prior to procedure. \par \par Recommendations reviewed with patient during this office visit, and all questions answered; Patient instructed on the importance of follow up and verbalizes understanding.\par \par I, Dr. LANDON [facility], personally performed the evaluation and management (E/M) services for this new patient. That E/M includes conducting the initial examination, assessing all conditions, and establishing the plan of care. Today, My ACP, Ronda Carrion, was here to observe my evaluation and management services for this patient to be followed going forward.\par \par \par

## 2022-12-08 NOTE — PHYSICAL EXAM
[Fully active, able to carry on all pre-disease performance without restriction] : Status 0 - Fully active, able to carry on all pre-disease performance without restriction [General Appearance - Alert] : alert [General Appearance - In No Acute Distress] : in no acute distress [General Appearance - Well Nourished] : well nourished [Sclera] : the sclera and conjunctiva were normal [Extraocular Movements] : extraocular movements were intact [Outer Ear] : the ears and nose were normal in appearance [Hearing Threshold Finger Rub Not Harvey] : hearing was normal [Neck Appearance] : the appearance of the neck was normal [Neck Cervical Mass (___cm)] : no neck mass was observed [Jugular Venous Distention Increased] : there was no jugular-venous distention [] : no respiratory distress [Respiration, Rhythm And Depth] : normal respiratory rhythm and effort [Exaggerated Use Of Accessory Muscles For Inspiration] : no accessory muscle use [Auscultation Breath Sounds / Voice Sounds] : lungs were clear to auscultation bilaterally [Heart Rate And Rhythm] : heart rate was normal and rhythm regular [Examination Of The Chest] : the chest was normal in appearance [Chest Visual Inspection Thoracic Asymmetry] : no chest asymmetry [Diminished Respiratory Excursion] : normal chest expansion [2+] : left 2+ [Breast Appearance] : normal in appearance [Breast Palpation Mass] : no palpable masses [Bowel Sounds] : normal bowel sounds [Abdomen Soft] : soft [Abdomen Tenderness] : non-tender [Cervical Lymph Nodes Enlarged Posterior Bilaterally] : posterior cervical [Cervical Lymph Nodes Enlarged Anterior Bilaterally] : anterior cervical [Supraclavicular Lymph Nodes Enlarged Bilaterally] : supraclavicular [No CVA Tenderness] : no ~M costovertebral angle tenderness [No Spinal Tenderness] : no spinal tenderness [Involuntary Movements] : no involuntary movements were seen [Skin Turgor] : normal skin turgor [No Focal Deficits] : no focal deficits [Oriented To Time, Place, And Person] : oriented to person, place, and time [FreeTextEntry1] : Deferred

## 2022-12-13 ENCOUNTER — OUTPATIENT (OUTPATIENT)
Dept: OUTPATIENT SERVICES | Facility: HOSPITAL | Age: 27
LOS: 1 days | End: 2022-12-13

## 2022-12-13 VITALS
WEIGHT: 162.92 LBS | OXYGEN SATURATION: 99 % | SYSTOLIC BLOOD PRESSURE: 94 MMHG | TEMPERATURE: 99 F | HEART RATE: 65 BPM | HEIGHT: 62 IN | RESPIRATION RATE: 14 BRPM | DIASTOLIC BLOOD PRESSURE: 60 MMHG

## 2022-12-13 DIAGNOSIS — R09.89 OTHER SPECIFIED SYMPTOMS AND SIGNS INVOLVING THE CIRCULATORY AND RESPIRATORY SYSTEMS: ICD-10-CM

## 2022-12-13 DIAGNOSIS — Q33.2 SEQUESTRATION OF LUNG: ICD-10-CM

## 2022-12-13 LAB
ANION GAP SERPL CALC-SCNC: 13 MMOL/L — SIGNIFICANT CHANGE UP (ref 7–14)
BLD GP AB SCN SERPL QL: NEGATIVE — SIGNIFICANT CHANGE UP
BUN SERPL-MCNC: 9 MG/DL — SIGNIFICANT CHANGE UP (ref 7–23)
CALCIUM SERPL-MCNC: 10 MG/DL — SIGNIFICANT CHANGE UP (ref 8.4–10.5)
CHLORIDE SERPL-SCNC: 102 MMOL/L — SIGNIFICANT CHANGE UP (ref 98–107)
CO2 SERPL-SCNC: 23 MMOL/L — SIGNIFICANT CHANGE UP (ref 22–31)
CREAT SERPL-MCNC: 0.47 MG/DL — LOW (ref 0.5–1.3)
EGFR: 135 ML/MIN/1.73M2 — SIGNIFICANT CHANGE UP
GLUCOSE SERPL-MCNC: 83 MG/DL — SIGNIFICANT CHANGE UP (ref 70–99)
HCG SERPL-ACNC: <5 MIU/ML — SIGNIFICANT CHANGE UP
HCT VFR BLD CALC: 35 % — SIGNIFICANT CHANGE UP (ref 34.5–45)
HGB BLD-MCNC: 10.9 G/DL — LOW (ref 11.5–15.5)
MCHC RBC-ENTMCNC: 25.6 PG — LOW (ref 27–34)
MCHC RBC-ENTMCNC: 31.1 GM/DL — LOW (ref 32–36)
MCV RBC AUTO: 82.2 FL — SIGNIFICANT CHANGE UP (ref 80–100)
NRBC # BLD: 0 /100 WBCS — SIGNIFICANT CHANGE UP (ref 0–0)
NRBC # FLD: 0 K/UL — SIGNIFICANT CHANGE UP (ref 0–0)
PLATELET # BLD AUTO: 405 K/UL — HIGH (ref 150–400)
POTASSIUM SERPL-MCNC: 3.9 MMOL/L — SIGNIFICANT CHANGE UP (ref 3.5–5.3)
POTASSIUM SERPL-SCNC: 3.9 MMOL/L — SIGNIFICANT CHANGE UP (ref 3.5–5.3)
RBC # BLD: 4.26 M/UL — SIGNIFICANT CHANGE UP (ref 3.8–5.2)
RBC # FLD: 13.8 % — SIGNIFICANT CHANGE UP (ref 10.3–14.5)
RH IG SCN BLD-IMP: POSITIVE — SIGNIFICANT CHANGE UP
SODIUM SERPL-SCNC: 138 MMOL/L — SIGNIFICANT CHANGE UP (ref 135–145)
WBC # BLD: 9.66 K/UL — SIGNIFICANT CHANGE UP (ref 3.8–10.5)
WBC # FLD AUTO: 9.66 K/UL — SIGNIFICANT CHANGE UP (ref 3.8–10.5)

## 2022-12-13 RX ORDER — BENZOYL PEROXIDE MICRONIZED 5.8 %
350 TOWELETTE (EA) TOPICAL
Qty: 0 | Refills: 0 | DISCHARGE

## 2022-12-13 RX ORDER — SODIUM CHLORIDE 9 MG/ML
1000 INJECTION, SOLUTION INTRAVENOUS
Refills: 0 | Status: DISCONTINUED | OUTPATIENT
Start: 2022-12-22 | End: 2022-12-23

## 2022-12-13 RX ORDER — ERGOCALCIFEROL 1.25 MG/1
0 CAPSULE ORAL
Qty: 0 | Refills: 0 | DISCHARGE

## 2022-12-13 NOTE — H&P PST ADULT - NSICDXPASTMEDICALHX_GEN_ALL_CORE_FT
PAST MEDICAL HISTORY:  Gestational diabetes mellitus (GDM) during first pregnancy     Lung sequestration     Normal vaginal delivery     Preeclampsia during first pregnancy    TB lung, latent     Urinary tract infection

## 2022-12-13 NOTE — H&P PST ADULT - ASSESSMENT
27 year old female with PMH of Latent TB- 2010,presents to [facility], with pre op diagnosis of Sequestration of Lung, for pre op evaluation prior to scheduled surgery- Flexible bronchoscopy, Right VATS, resection of sequestration lung with Dr Calvin.

## 2022-12-13 NOTE — H&P PST ADULT - HISTORY OF PRESENT ILLNESS
27 year old female with PMH of Latent TB- 2010,presents to [facility], with pre op diagnosis of Sequestration of Lung, for pre op evaluation prior to scheduled surgery- Flexible bronchoscopy, Right VATS, resection of sequestration lung with Dr Calvin.

## 2022-12-13 NOTE — H&P PST ADULT - PROBLEM SELECTOR PLAN 1
Patient is tentatively scheduled for surgery- Flexible bronchoscopy, Right VATS, resection of sequestration lung with Dr Calvin on 12/22/2022.    Pre-op instructions provided. Pt given verbal and written instructions with teach back on chlorhexidine wash and pepcid. Pt verbalized understanding with return demonstration.    Routine Covid PCR test ordered .Instructions regarding covid PCR test and locations for covid testing site provided. Pt verbalized understanding    Urine cup provided for day of procedure pregnancy test.

## 2022-12-13 NOTE — H&P PST ADULT - NSANTHOSAYNRD_GEN_A_CORE
No. KUSH screening performed.  STOP BANG Legend: 0-2 = LOW Risk; 3-4 = INTERMEDIATE Risk; 5-8 = HIGH Risk

## 2022-12-13 NOTE — H&P PST ADULT - RESPIRATORY AND THORAX COMMENTS
occasional - dry cough, pt reports of right sided flank pain since 1 year, which got worse recently admitted to ER- followed up with pulmonologist

## 2022-12-21 LAB — SARS-COV-2 N GENE NPH QL NAA+PROBE: NOT DETECTED

## 2022-12-21 NOTE — ASU PATIENT PROFILE, ADULT - FALL HARM RISK - UNIVERSAL INTERVENTIONS
Bed in lowest position, wheels locked, appropriate side rails in place/Call bell, personal items and telephone in reach/Instruct patient to call for assistance before getting out of bed or chair/Non-slip footwear when patient is out of bed/[place] to call system/Physically safe environment - no spills, clutter or unnecessary equipment/Purposeful Proactive Rounding/Room/bathroom lighting operational, light cord in reach

## 2022-12-22 ENCOUNTER — APPOINTMENT (OUTPATIENT)
Dept: THORACIC SURGERY | Facility: HOSPITAL | Age: 27
End: 2022-12-22

## 2022-12-22 ENCOUNTER — INPATIENT (INPATIENT)
Facility: HOSPITAL | Age: 27
LOS: 0 days | Discharge: HOME | End: 2022-12-23
Attending: THORACIC SURGERY (CARDIOTHORACIC VASCULAR SURGERY) | Admitting: THORACIC SURGERY (CARDIOTHORACIC VASCULAR SURGERY)
Payer: MEDICAID

## 2022-12-22 ENCOUNTER — RESULT REVIEW (OUTPATIENT)
Age: 27
End: 2022-12-22

## 2022-12-22 VITALS
DIASTOLIC BLOOD PRESSURE: 61 MMHG | TEMPERATURE: 98 F | SYSTOLIC BLOOD PRESSURE: 108 MMHG | OXYGEN SATURATION: 97 % | HEART RATE: 81 BPM | HEIGHT: 62 IN | WEIGHT: 162.92 LBS | RESPIRATION RATE: 16 BRPM

## 2022-12-22 DIAGNOSIS — Q33.2 SEQUESTRATION OF LUNG: ICD-10-CM

## 2022-12-22 LAB — HCG UR QL: NEGATIVE — SIGNIFICANT CHANGE UP

## 2022-12-22 PROCEDURE — 32663 THORACOSCOPY W/LOBECTOMY: CPT | Mod: RT

## 2022-12-22 PROCEDURE — 88305 TISSUE EXAM BY PATHOLOGIST: CPT | Mod: 26

## 2022-12-22 PROCEDURE — 88309 TISSUE EXAM BY PATHOLOGIST: CPT | Mod: 26

## 2022-12-22 PROCEDURE — 99233 SBSQ HOSP IP/OBS HIGH 50: CPT

## 2022-12-22 PROCEDURE — 71045 X-RAY EXAM CHEST 1 VIEW: CPT | Mod: 26

## 2022-12-22 DEVICE — CHEST DRAIN THORACIC ARGYLE PVC 20FR STRAIGHT: Type: IMPLANTABLE DEVICE | Status: FUNCTIONAL

## 2022-12-22 DEVICE — STAPLER ETHICON GST ECHELON 45MM GOLD RELOAD: Type: IMPLANTABLE DEVICE | Status: FUNCTIONAL

## 2022-12-22 DEVICE — STAPLER ETHICON GST ECHELON 45MM GREEN RELOAD: Type: IMPLANTABLE DEVICE | Status: FUNCTIONAL

## 2022-12-22 DEVICE — AGENT HEMOSTATIC HEMOBLAST 1.65G 10CM: Type: IMPLANTABLE DEVICE | Status: FUNCTIONAL

## 2022-12-22 DEVICE — STAPLER ETHICON ECHELON ENDOPATH VASCULAR 35MM WHITE RELOAD: Type: IMPLANTABLE DEVICE | Status: FUNCTIONAL

## 2022-12-22 RX ORDER — SODIUM CHLORIDE 9 MG/ML
500 INJECTION, SOLUTION INTRAVENOUS ONCE
Refills: 0 | Status: DISCONTINUED | OUTPATIENT
Start: 2022-12-22 | End: 2022-12-22

## 2022-12-22 RX ORDER — NALOXONE HYDROCHLORIDE 4 MG/.1ML
0.1 SPRAY NASAL
Refills: 0 | Status: DISCONTINUED | OUTPATIENT
Start: 2022-12-22 | End: 2022-12-23

## 2022-12-22 RX ORDER — SODIUM CHLORIDE 9 MG/ML
250 INJECTION, SOLUTION INTRAVENOUS ONCE
Refills: 0 | Status: COMPLETED | OUTPATIENT
Start: 2022-12-22 | End: 2022-12-22

## 2022-12-22 RX ORDER — ONDANSETRON 8 MG/1
4 TABLET, FILM COATED ORAL EVERY 6 HOURS
Refills: 0 | Status: DISCONTINUED | OUTPATIENT
Start: 2022-12-22 | End: 2022-12-23

## 2022-12-22 RX ORDER — HEPARIN SODIUM 5000 [USP'U]/ML
5000 INJECTION INTRAVENOUS; SUBCUTANEOUS ONCE
Refills: 0 | Status: COMPLETED | OUTPATIENT
Start: 2022-12-22 | End: 2022-12-22

## 2022-12-22 RX ORDER — HYDROMORPHONE HYDROCHLORIDE 2 MG/ML
0.5 INJECTION INTRAMUSCULAR; INTRAVENOUS; SUBCUTANEOUS
Refills: 0 | Status: DISCONTINUED | OUTPATIENT
Start: 2022-12-22 | End: 2022-12-23

## 2022-12-22 RX ORDER — GABAPENTIN 400 MG/1
300 CAPSULE ORAL ONCE
Refills: 0 | Status: COMPLETED | OUTPATIENT
Start: 2022-12-22 | End: 2022-12-22

## 2022-12-22 RX ORDER — ACETAMINOPHEN 500 MG
1000 TABLET ORAL ONCE
Refills: 0 | Status: COMPLETED | OUTPATIENT
Start: 2022-12-22

## 2022-12-22 RX ORDER — SODIUM CHLORIDE 9 MG/ML
500 INJECTION, SOLUTION INTRAVENOUS ONCE
Refills: 0 | Status: DISCONTINUED | OUTPATIENT
Start: 2022-12-22 | End: 2022-12-23

## 2022-12-22 RX ORDER — HEPARIN SODIUM 5000 [USP'U]/ML
5000 INJECTION INTRAVENOUS; SUBCUTANEOUS EVERY 8 HOURS
Refills: 0 | Status: DISCONTINUED | OUTPATIENT
Start: 2022-12-22 | End: 2022-12-23

## 2022-12-22 RX ORDER — ACETAMINOPHEN 500 MG
975 TABLET ORAL ONCE
Refills: 0 | Status: COMPLETED | OUTPATIENT
Start: 2022-12-22 | End: 2022-12-22

## 2022-12-22 RX ORDER — ACETAMINOPHEN 500 MG
1000 TABLET ORAL ONCE
Refills: 0 | Status: COMPLETED | OUTPATIENT
Start: 2022-12-22 | End: 2022-12-22

## 2022-12-22 RX ORDER — HYDROMORPHONE HYDROCHLORIDE 2 MG/ML
30 INJECTION INTRAMUSCULAR; INTRAVENOUS; SUBCUTANEOUS
Refills: 0 | Status: DISCONTINUED | OUTPATIENT
Start: 2022-12-22 | End: 2022-12-23

## 2022-12-22 RX ORDER — POLYETHYLENE GLYCOL 3350 17 G/17G
17 POWDER, FOR SOLUTION ORAL DAILY
Refills: 0 | Status: DISCONTINUED | OUTPATIENT
Start: 2022-12-22 | End: 2022-12-23

## 2022-12-22 RX ORDER — FAMOTIDINE 10 MG/ML
20 INJECTION INTRAVENOUS EVERY 12 HOURS
Refills: 0 | Status: DISCONTINUED | OUTPATIENT
Start: 2022-12-22 | End: 2022-12-23

## 2022-12-22 RX ORDER — SENNA PLUS 8.6 MG/1
2 TABLET ORAL AT BEDTIME
Refills: 0 | Status: DISCONTINUED | OUTPATIENT
Start: 2022-12-22 | End: 2022-12-23

## 2022-12-22 RX ADMIN — HEPARIN SODIUM 5000 UNIT(S): 5000 INJECTION INTRAVENOUS; SUBCUTANEOUS at 06:44

## 2022-12-22 RX ADMIN — HYDROMORPHONE HYDROCHLORIDE 30 MILLILITER(S): 2 INJECTION INTRAMUSCULAR; INTRAVENOUS; SUBCUTANEOUS at 18:02

## 2022-12-22 RX ADMIN — Medication 1000 MILLIGRAM(S): at 21:15

## 2022-12-22 RX ADMIN — SODIUM CHLORIDE 30 MILLILITER(S): 9 INJECTION, SOLUTION INTRAVENOUS at 11:06

## 2022-12-22 RX ADMIN — HEPARIN SODIUM 5000 UNIT(S): 5000 INJECTION INTRAVENOUS; SUBCUTANEOUS at 14:36

## 2022-12-22 RX ADMIN — SODIUM CHLORIDE 500 MILLILITER(S): 9 INJECTION, SOLUTION INTRAVENOUS at 13:15

## 2022-12-22 RX ADMIN — Medication 975 MILLIGRAM(S): at 06:42

## 2022-12-22 RX ADMIN — HYDROMORPHONE HYDROCHLORIDE 30 MILLILITER(S): 2 INJECTION INTRAMUSCULAR; INTRAVENOUS; SUBCUTANEOUS at 11:01

## 2022-12-22 RX ADMIN — SODIUM CHLORIDE 30 MILLILITER(S): 9 INJECTION, SOLUTION INTRAVENOUS at 22:00

## 2022-12-22 RX ADMIN — HEPARIN SODIUM 5000 UNIT(S): 5000 INJECTION INTRAVENOUS; SUBCUTANEOUS at 22:01

## 2022-12-22 RX ADMIN — SODIUM CHLORIDE 30 MILLILITER(S): 9 INJECTION, SOLUTION INTRAVENOUS at 06:42

## 2022-12-22 RX ADMIN — FAMOTIDINE 20 MILLIGRAM(S): 10 INJECTION INTRAVENOUS at 22:01

## 2022-12-22 RX ADMIN — HYDROMORPHONE HYDROCHLORIDE 30 MILLILITER(S): 2 INJECTION INTRAMUSCULAR; INTRAVENOUS; SUBCUTANEOUS at 22:05

## 2022-12-22 RX ADMIN — Medication 400 MILLIGRAM(S): at 20:30

## 2022-12-22 RX ADMIN — SENNA PLUS 2 TABLET(S): 8.6 TABLET ORAL at 22:01

## 2022-12-22 RX ADMIN — GABAPENTIN 300 MILLIGRAM(S): 400 CAPSULE ORAL at 06:44

## 2022-12-22 NOTE — BRIEF OPERATIVE NOTE - ASSISTANT(S)
BRANDY Stevenson MD Render Risk Assessment In Note?: no Detail Level: Simple Additional Notes: Discussed needing 2-3 syringes of filler for tear troughs, quoted at $8420-9330 ($600/syringe).

## 2022-12-23 ENCOUNTER — TRANSCRIPTION ENCOUNTER (OUTPATIENT)
Age: 27
End: 2022-12-23

## 2022-12-23 VITALS
SYSTOLIC BLOOD PRESSURE: 100 MMHG | RESPIRATION RATE: 23 BRPM | HEART RATE: 82 BPM | DIASTOLIC BLOOD PRESSURE: 68 MMHG | OXYGEN SATURATION: 95 %

## 2022-12-23 LAB
ANION GAP SERPL CALC-SCNC: 10 MMOL/L — SIGNIFICANT CHANGE UP (ref 7–14)
BASOPHILS # BLD AUTO: 0.01 K/UL — SIGNIFICANT CHANGE UP (ref 0–0.2)
BASOPHILS NFR BLD AUTO: 0.1 % — SIGNIFICANT CHANGE UP (ref 0–2)
BUN SERPL-MCNC: 8 MG/DL — SIGNIFICANT CHANGE UP (ref 7–23)
CALCIUM SERPL-MCNC: 9.1 MG/DL — SIGNIFICANT CHANGE UP (ref 8.4–10.5)
CHLORIDE SERPL-SCNC: 110 MMOL/L — HIGH (ref 98–107)
CO2 SERPL-SCNC: 24 MMOL/L — SIGNIFICANT CHANGE UP (ref 22–31)
CREAT SERPL-MCNC: 0.43 MG/DL — LOW (ref 0.5–1.3)
EGFR: 138 ML/MIN/1.73M2 — SIGNIFICANT CHANGE UP
EOSINOPHIL # BLD AUTO: 0 K/UL — SIGNIFICANT CHANGE UP (ref 0–0.5)
EOSINOPHIL NFR BLD AUTO: 0 % — SIGNIFICANT CHANGE UP (ref 0–6)
GLUCOSE SERPL-MCNC: 124 MG/DL — HIGH (ref 70–99)
HCT VFR BLD CALC: 29.4 % — LOW (ref 34.5–45)
HGB BLD-MCNC: 9.2 G/DL — LOW (ref 11.5–15.5)
IANC: 8.81 K/UL — HIGH (ref 1.8–7.4)
IMM GRANULOCYTES NFR BLD AUTO: 0.4 % — SIGNIFICANT CHANGE UP (ref 0–0.9)
LYMPHOCYTES # BLD AUTO: 1.54 K/UL — SIGNIFICANT CHANGE UP (ref 1–3.3)
LYMPHOCYTES # BLD AUTO: 13.8 % — SIGNIFICANT CHANGE UP (ref 13–44)
MAGNESIUM SERPL-MCNC: 2 MG/DL — SIGNIFICANT CHANGE UP (ref 1.6–2.6)
MCHC RBC-ENTMCNC: 25.6 PG — LOW (ref 27–34)
MCHC RBC-ENTMCNC: 31.3 GM/DL — LOW (ref 32–36)
MCV RBC AUTO: 81.7 FL — SIGNIFICANT CHANGE UP (ref 80–100)
MONOCYTES # BLD AUTO: 0.72 K/UL — SIGNIFICANT CHANGE UP (ref 0–0.9)
MONOCYTES NFR BLD AUTO: 6.5 % — SIGNIFICANT CHANGE UP (ref 2–14)
NEUTROPHILS # BLD AUTO: 8.81 K/UL — HIGH (ref 1.8–7.4)
NEUTROPHILS NFR BLD AUTO: 79.2 % — HIGH (ref 43–77)
NRBC # BLD: 0 /100 WBCS — SIGNIFICANT CHANGE UP (ref 0–0)
NRBC # FLD: 0 K/UL — SIGNIFICANT CHANGE UP (ref 0–0)
PHOSPHATE SERPL-MCNC: 3 MG/DL — SIGNIFICANT CHANGE UP (ref 2.5–4.5)
PLATELET # BLD AUTO: 334 K/UL — SIGNIFICANT CHANGE UP (ref 150–400)
POTASSIUM SERPL-MCNC: 4.1 MMOL/L — SIGNIFICANT CHANGE UP (ref 3.5–5.3)
POTASSIUM SERPL-SCNC: 4.1 MMOL/L — SIGNIFICANT CHANGE UP (ref 3.5–5.3)
RBC # BLD: 3.6 M/UL — LOW (ref 3.8–5.2)
RBC # FLD: 14 % — SIGNIFICANT CHANGE UP (ref 10.3–14.5)
SODIUM SERPL-SCNC: 144 MMOL/L — SIGNIFICANT CHANGE UP (ref 135–145)
WBC # BLD: 11.13 K/UL — HIGH (ref 3.8–10.5)
WBC # FLD AUTO: 11.13 K/UL — HIGH (ref 3.8–10.5)

## 2022-12-23 PROCEDURE — 71045 X-RAY EXAM CHEST 1 VIEW: CPT | Mod: 26,77

## 2022-12-23 PROCEDURE — 99233 SBSQ HOSP IP/OBS HIGH 50: CPT

## 2022-12-23 PROCEDURE — 71045 X-RAY EXAM CHEST 1 VIEW: CPT | Mod: 26

## 2022-12-23 RX ORDER — OXYCODONE HYDROCHLORIDE 5 MG/1
5 TABLET ORAL
Refills: 0 | Status: DISCONTINUED | OUTPATIENT
Start: 2022-12-23 | End: 2022-12-23

## 2022-12-23 RX ORDER — OXYCODONE HYDROCHLORIDE 5 MG/1
10 TABLET ORAL
Refills: 0 | Status: DISCONTINUED | OUTPATIENT
Start: 2022-12-23 | End: 2022-12-23

## 2022-12-23 RX ORDER — POLYETHYLENE GLYCOL 3350 17 G/17G
17 POWDER, FOR SOLUTION ORAL
Qty: 0 | Refills: 0 | DISCHARGE
Start: 2022-12-23

## 2022-12-23 RX ORDER — ACETAMINOPHEN 500 MG
1000 TABLET ORAL ONCE
Refills: 0 | Status: COMPLETED | OUTPATIENT
Start: 2022-12-23 | End: 2022-12-23

## 2022-12-23 RX ORDER — SENNA PLUS 8.6 MG/1
2 TABLET ORAL
Qty: 0 | Refills: 0 | DISCHARGE
Start: 2022-12-23

## 2022-12-23 RX ORDER — OXYCODONE HYDROCHLORIDE 5 MG/1
1 TABLET ORAL
Qty: 20 | Refills: 0
Start: 2022-12-23 | End: 2022-12-27

## 2022-12-23 RX ORDER — ACETAMINOPHEN 500 MG
650 TABLET ORAL EVERY 8 HOURS
Refills: 0 | Status: DISCONTINUED | OUTPATIENT
Start: 2022-12-23 | End: 2022-12-23

## 2022-12-23 RX ADMIN — POLYETHYLENE GLYCOL 3350 17 GRAM(S): 17 POWDER, FOR SOLUTION ORAL at 13:59

## 2022-12-23 RX ADMIN — OXYCODONE HYDROCHLORIDE 10 MILLIGRAM(S): 5 TABLET ORAL at 13:10

## 2022-12-23 RX ADMIN — Medication 650 MILLIGRAM(S): at 12:40

## 2022-12-23 RX ADMIN — OXYCODONE HYDROCHLORIDE 5 MILLIGRAM(S): 5 TABLET ORAL at 11:20

## 2022-12-23 RX ADMIN — OXYCODONE HYDROCHLORIDE 10 MILLIGRAM(S): 5 TABLET ORAL at 12:40

## 2022-12-23 RX ADMIN — FAMOTIDINE 20 MILLIGRAM(S): 10 INJECTION INTRAVENOUS at 05:01

## 2022-12-23 RX ADMIN — HEPARIN SODIUM 5000 UNIT(S): 5000 INJECTION INTRAVENOUS; SUBCUTANEOUS at 13:59

## 2022-12-23 RX ADMIN — OXYCODONE HYDROCHLORIDE 5 MILLIGRAM(S): 5 TABLET ORAL at 10:50

## 2022-12-23 RX ADMIN — Medication 650 MILLIGRAM(S): at 12:10

## 2022-12-23 RX ADMIN — Medication 400 MILLIGRAM(S): at 05:36

## 2022-12-23 RX ADMIN — HEPARIN SODIUM 5000 UNIT(S): 5000 INJECTION INTRAVENOUS; SUBCUTANEOUS at 05:01

## 2022-12-23 RX ADMIN — Medication 1000 MILLIGRAM(S): at 06:15

## 2022-12-23 NOTE — DISCHARGE NOTE NURSING/CASE MANAGEMENT/SOCIAL WORK - NSDCPEFALRISK_GEN_ALL_CORE
For information on Fall & Injury Prevention, visit: [email or web address] OR  [email or web address] OR  https://www.cdc.gov/steadi/patient.html

## 2022-12-23 NOTE — DISCHARGE NOTE PROVIDER - NSDCFUADDAPPT_GEN_ALL_CORE_FT
- Please call and schedule your follow up appointment with Dr. Calvin for 1 week from now  - Please also call and schedule a follow up appointment with your primary care provider/Family doctor for 1-2 weeks from now

## 2022-12-23 NOTE — DISCHARGE NOTE PROVIDER - CARE PROVIDER_API CALL
Emile Calvin)  Surgery; Thoracic Surgery  [address] [facility] [place] [address]  Phone: [phone]  Fax: [phone]  Follow Up Time: 1 week

## 2022-12-23 NOTE — DISCHARGE NOTE PROVIDER - NSDCCPCAREPLAN_GEN_ALL_CORE_FT
PRINCIPAL DISCHARGE DIAGNOSIS  Diagnosis: Sequestration of lung  Assessment and Plan of Treatment: - Follow up with Dr. Calvin in 1-2 weeks [phone]. Please call the office to make an appointment.   - Have your Chest x-ray done 1-2 days prior to your appointment.    - Please bring copy of x-ray to your appointment.  - Follow up with primary care provider in one week.  - Take the prescribed pain medication ONLY as needed.  - Can use over the counter Ibuprofen & or Tylenol as directed on the bottle.   - Please take a laxative or stool softeners to help support your bowel movements while on narcotic pain medication as it can cause constipation. Laxatives & stool softeners can be available over the counter at your local pharmacy.

## 2022-12-23 NOTE — PROGRESS NOTE ADULT - SUBJECTIVE AND OBJECTIVE BOX
CHIEF COMPLAINT: FOLLOW UP IN ICU FOR POSTOPERATIVE CARE OF PATIENT WHO IS S/P LUNG RESECTION      PROCEDURES:   Uniportal RVATS, RLL lobectomy 22-Dec-2022      ISSUES:   Lung sequestration  Chest tube in place  Postoperative pain  Hx of Latent TB    INTERVAL EVENTS:   Chest tube without airleak      HISTORY:   Patient reports moderate pain at chest wall incision sites which is worse with coughing and deep breathing without associated fever or dyspnea. Pain is improved with use of pain meds.     PHYSICAL EXAM:   Gen: Comfortable, No acute distress  Eyes: Sclera white, Conjunctiva normal, Eyelids normal, Pupils symmetrical   ENT: Mucous membranes moist,  ,  ,    Neck: Trachea midline,  ,  ,  ,  ,  ,    CV: Rate regular, Rhythm regular,  ,  ,    Resp: Breath sounds clear, No accessory muscles use, R chest tube in place,  ,    Abd: Soft, Non-distended, Non-tender, Bowel sounds normal,  ,  ,    Skin: Warm, No peripheral edema of lower extremities,  ,    : No villanueva  Neuro: Moving all 4 extremities,    Psych: A&Ox3      ASSESSMENT AND PLAN:     NEURO:  Post-operative Pain - Stable. Pain control with oxycodone and Tylenol PRN.             RESPIRATORY:  Stable on room air - Incentive spirometry. Chest PT and suctioning of secretions. Out of bed to chair and ambulate with assistance. Continuous pulse oximetry for support & to prevent decompensation.       Chest tube – Discontinue chest tube         Lung sequestration - improved s/p resection.        CARDIOVASCULAR:  Hemodynamically stable - Not on pressors. Continue hemodynamic monitoring.  Telemetry (medical test) - Reviewed by me today independently. Normal sinus rhythm.                RENAL:  Stable - Monitor IOs and electrolytes. Keep K above 4.0 and Mg above 2.0.          GASTROINTESTINAL:  GI prophylaxis not indicated  Zofran and Reglan IV PRN for nausea  Regular consistency diet          HEMATOLOGIC:  No signs of active bleeding. Monitor Hgb in CBC in AM  DVT prophylaxis with heparin subQ and SCDs.         INFECTIOUS DISEASE:  All surgical sites appear clean. No signs of active infection. Will monitor for fever and leukocytosis.         ENDOCRINE:  Stable – Monitor glucose fingersticks for goal 120-180.            Pertinent clinical, laboratory, radiographic, hemodynamic, echocardiographic, respiratory data, microbiologic data and chart were reviewed by myself and analyzed frequently throughout the course of the day and night by myself.    Plan discussed at length with the CTICU staff and Attending CT Surgeon -   Dr Emile Calvin    Patient's status was discussed with patient at bedside.      _________________________  VITAL SIGNS:  Vital Signs Last 24 Hrs  T(C): 37 (23 Dec 2022 12:00), Max: 37 (22 Dec 2022 20:00)  T(F): 98.6 (23 Dec 2022 12:00), Max: 98.6 (22 Dec 2022 20:00)  HR: 74 (23 Dec 2022 14:00) (57 - 95)  BP: 91/70 (23 Dec 2022 14:00) (90/62 - 107/80)  BP(mean): 78 (23 Dec 2022 14:00) (50 - 90)  RR: 20 (23 Dec 2022 14:00) (11 - 29)  SpO2: 95% (23 Dec 2022 14:00) (93% - 100%)    Parameters below as of 23 Dec 2022 14:00  Patient On (Oxygen Delivery Method): room air      I/Os:   I&O's Detail    22 Dec 2022 07:01  -  23 Dec 2022 07:00  --------------------------------------------------------  IN:    IV PiggyBack: 450 mL    Lactated Ringers: 540 mL    Oral Fluid: 480 mL  Total IN: 1470 mL    OUT:    Chest Tube (mL): 120 mL    Voided (mL): 1900 mL  Total OUT: 2020 mL    Total NET: -550 mL      23 Dec 2022 07:01  -  23 Dec 2022 14:51  --------------------------------------------------------  IN:    Lactated Ringers: 120 mL  Total IN: 120 mL    OUT:    Chest Tube (mL): 10 mL    Voided (mL): 100 mL  Total OUT: 110 mL    Total NET: 10 mL              MEDICATIONS:  MEDICATIONS  (STANDING):  acetaminophen     Tablet .. 650 milliGRAM(s) Oral every 8 hours  heparin   Injectable 5000 Unit(s) SubCutaneous every 8 hours  lactated ringers. 1000 milliLiter(s) (30 mL/Hr) IV Continuous <Continuous>  polyethylene glycol 3350 17 Gram(s) Oral daily  senna 2 Tablet(s) Oral at bedtime    MEDICATIONS  (PRN):  naloxone Injectable 0.1 milliGRAM(s) IV Push every 3 minutes PRN For ANY of the following changes in patient status:  A. RR LESS THAN 10 breaths per minute, B. Oxygen saturation LESS THAN 90%, C. Sedation score of 6  ondansetron Injectable 4 milliGRAM(s) IV Push every 6 hours PRN Nausea  oxyCODONE    IR 5 milliGRAM(s) Oral every 3 hours PRN Moderate Pain (4 - 6)  oxyCODONE    IR 10 milliGRAM(s) Oral every 3 hours PRN Severe Pain (7 - 10)      LABS:  Laboratory data was independently reviewed by me today.                           9.2    11.13 )-----------( 334      ( 23 Dec 2022 03:30 )             29.4     12-23    144  |  110<H>  |  8   ----------------------------<  124<H>  4.1   |  24  |  0.43<L>    Ca    9.1      23 Dec 2022 03:30  Phos  3.0     12-23  Mg     2.00     12-23                RADIOLOGY:   Radiology images were independently reviewed by me today. Reports were reviewed by me today.    Xray Chest 1 View- PORTABLE-Urgent:   ACC: [id number] EXAM:  XR CHEST PORTABLE URGENT 1V                          PROCEDURE DATE:  12/22/2022          INTERPRETATION:  CLINICAL INFORMATION: Post right thoracotomy    TIME OF EXAMINATION: December 22, 2022 at 10:39 AM    EXAM: Portable chest    FINDINGS:  Right-sided chest tube has its tip overlying the apex of the lung. No   effusion or pneumothorax seen. The heart is not enlarged. Small amount of   subcutaneous air in the right chest.    Right paratracheal and hilar widening along the course of the chest tube   likely postop atelectasis/hematoma.        COMPARISON: December 4, 2020        IMPRESSION: Status post right thoracotomy with chest tube.    --- End of Report ---            DUNCAN SINGH MD; Attending Radiologist  This document has been electronically signed. Dec 22 2022 11:16AM (12-22-22 @ 10:45)  
Anesthesia Pain Management Service    SUBJECTIVE: Patient is doing well with IV PCA. She has pain with activity and Tylenol helps.    Pain Scale Score	At rest: ___ 	With Activity: _8/10__ 	[X ] Refer to charted pain scores    THERAPY:    [ ] IV PCA Morphine		[ ] 5 mg/mL	[ ] 1 mg/mL  [X ] IV PCA Hydromorphone	[ ] 5 mg/mL	[X ] 1 mg/mL  [ ] IV PCA Fentanyl		[ ] 50 micrograms/mL    Demand dose __0.2_ lockout __6_ (minutes) Continuous Rate _0__ Total: _4.5__   mg used (in past 24 hrs)      MEDICATIONS  (STANDING):  famotidine    Tablet 20 milliGRAM(s) Oral every 12 hours  heparin   Injectable 5000 Unit(s) SubCutaneous every 8 hours  lactated ringers. 1000 milliLiter(s) (30 mL/Hr) IV Continuous <Continuous>  polyethylene glycol 3350 17 Gram(s) Oral daily  senna 2 Tablet(s) Oral at bedtime    MEDICATIONS  (PRN):  naloxone Injectable 0.1 milliGRAM(s) IV Push every 3 minutes PRN For ANY of the following changes in patient status:  A. RR LESS THAN 10 breaths per minute, B. Oxygen saturation LESS THAN 90%, C. Sedation score of 6  ondansetron Injectable 4 milliGRAM(s) IV Push every 6 hours PRN Nausea  oxyCODONE    IR 5 milliGRAM(s) Oral every 3 hours PRN Moderate Pain (4 - 6)  oxyCODONE    IR 10 milliGRAM(s) Oral every 3 hours PRN Severe Pain (7 - 10)      OBJECTIVE: Patient sitting up in bed, ctx1    Sedation Score:	[ X] Alert	[ ] Drowsy 	[ ] Arousable	[ ] Asleep	[ ] Unresponsive    Side Effects:	[X ] None	[ ] Nausea	[ ] Vomiting	[ ] Pruritus  		[ ] Other:    Vital Signs Last 24 Hrs  T(C): 36.9 (23 Dec 2022 08:00), Max: 37 (22 Dec 2022 20:00)  T(F): 98.5 (23 Dec 2022 08:00), Max: 98.6 (22 Dec 2022 20:00)  HR: 79 (23 Dec 2022 10:00) (57 - 95)  BP: 107/80 (23 Dec 2022 10:00) (81/47 - 108/53)  BP(mean): 90 (23 Dec 2022 10:00) (50 - 90)  RR: 21 (23 Dec 2022 10:00) (11 - 31)  SpO2: 96% (23 Dec 2022 10:00) (92% - 100%)    Parameters below as of 23 Dec 2022 10:00  Patient On (Oxygen Delivery Method): room air        ASSESSMENT/ PLAN    Therapy to  be:	[ ] Continue   [ X] Discontinued   [X ] Change to prn Analgesics    Documentation and Verification of current medications:   [X] Done	[ ] Not done, not elligible    Comments: Discussed patient with primary team, IV PCA discontinued. PRN Oral/IV opioids and/or Adjuvant non-opioid medication to be ordered at this point.    Progress Note written now but Patient was seen earlier.
Anesthesia Pain Management Service- Attending Addendum    SUBJECTIVE: Patient's pain control adequate    Therapy:	  [ X] IV PCA	   [ ] Epidural           [ ] s/p Spinal Opoid              [ ] Postpartum infusion	  [ ] Patient controlled regional anesthesia (PCRA)    [ ] prn Analgesics    Allergies    No Known Allergies    Intolerances      MEDICATIONS  (STANDING):  acetaminophen     Tablet .. 650 milliGRAM(s) Oral every 8 hours  famotidine    Tablet 20 milliGRAM(s) Oral every 12 hours  heparin   Injectable 5000 Unit(s) SubCutaneous every 8 hours  lactated ringers. 1000 milliLiter(s) (30 mL/Hr) IV Continuous <Continuous>  polyethylene glycol 3350 17 Gram(s) Oral daily  senna 2 Tablet(s) Oral at bedtime    MEDICATIONS  (PRN):  naloxone Injectable 0.1 milliGRAM(s) IV Push every 3 minutes PRN For ANY of the following changes in patient status:  A. RR LESS THAN 10 breaths per minute, B. Oxygen saturation LESS THAN 90%, C. Sedation score of 6  ondansetron Injectable 4 milliGRAM(s) IV Push every 6 hours PRN Nausea  oxyCODONE    IR 5 milliGRAM(s) Oral every 3 hours PRN Moderate Pain (4 - 6)  oxyCODONE    IR 10 milliGRAM(s) Oral every 3 hours PRN Severe Pain (7 - 10)      OBJECTIVE:   [X] No new signs     [ ] Other:    Side Effects:  [X ] None			[ ] Other:      ASSESSMENT/PLAN  -Discontinue current therapy    [ ] Therapy changed to:    [ ] IV PCA       [ ] Epidural     [ X] prn Analgesics     Comments: Pain management per primary team, APS to sign off    Note entered after patient seen
    CHIEF COMPLAINT: FOLLOW UP IN ICU FOR POSTOPERATIVE CARE OF PATIENT WHO IS S/P RLL lobectomy      PROCEDURES:     Uniportal RVATS, RLL lobectomy 22-Dec-2022      ISSUES:     Lung sequestration  Chest tube in place  Postoperative pain    INTERVAL EVENTS:   Chest tube without airleak      HISTORY:   Patient reports moderate pain at chest wall incision sites which is worse with coughing and deep breathing without associated fever or dyspnea. Pain is improved with use of pain meds.     PHYSICAL EXAM:   Gen: Comfortable, No acute distress  Eyes: Sclera white, Conjunctiva normal, Eyelids normal, Pupils symmetrical   ENT: Mucous membranes moist,  ,  ,    Neck: Trachea midline,  ,  ,  ,  ,  ,    CV: Rate regular, Rhythm regular,  ,  ,    Resp: Breath sounds clear, No accessory muscles use, R chest tube in place,  ,    Abd: Soft, Non-distended, Non-tender, Bowel sounds normal,  ,  ,    Skin: Warm, No peripheral edema of lower extremities,  ,    : No villanueva  Neuro: Moving all 4 extremities,    Psych: A&Ox3      ASSESSMENT AND PLAN:     NEURO:  Post-operative Pain - Stable. Pain control with PCA and Tylenol IV PRN.             RESPIRATORY:  Hypoxia - Wean nasal cannula for goal O2sat above 92. Obtain CXR. Incentive spirometry. Chest PT and frequent suctioning. Continue bronchodilators. OOB to chair & ambulate w/ assistance. Continuous pulse oximetry for support & to prevent decompensation.       Chest tube – Pleurevac regulated water seal. Monitor chest tube output.               CARDIOVASCULAR:  Hemodynamically stable - Not on pressors. Continue hemodynamic monitoring.  Telemetry (medical test) - Reviewed by me today independently. Normal sinus rhythm.                RENAL:  Stable - Monitor IOs and electrolytes. Keep K above 4.0 and Mg above 2.0.          GASTROINTESTINAL:  GI prophylaxis not indicated  Zofran and Reglan IV PRN for nausea  Regular consistency diet          HEMATOLOGIC:  No signs of active bleeding. Monitor Hgb in CBC in AM  DVT prophylaxis with heparin subQ and SCDs.         INFECTIOUS DISEASE:  All surgical sites appear clean. No signs of active infection. Will monitor for fever and leukocytosis.         ENDOCRINE:  Stable – Monitor glucose fingersticks for goal 120-180.         ONCOLOGY:  Lung nodule - Improved. S/P resection. Follow up final pathology.      Pertinent clinical, laboratory, radiographic, hemodynamic, echocardiographic, respiratory data, microbiologic data and chart were reviewed by myself and analyzed frequently throughout the course of the day and night by myself.    Plan discussed at length with the CTICU staff and Attending CT Surgeon -   Dr Emile Calvin    Patient's status was discussed with patient at bedside.     ________________________________________________    _________________________  VITAL SIGNS:  Vital Signs Last 24 Hrs  T(C): 36.8 (22 Dec 2022 17:00), Max: 36.9 (22 Dec 2022 06:12)  T(F): 98.2 (22 Dec 2022 17:00), Max: 98.4 (22 Dec 2022 06:12)  HR: 88 (22 Dec 2022 17:00) (69 - 88)  BP: 105/67 (22 Dec 2022 17:00) (81/47 - 108/61)  BP(mean): 75 (22 Dec 2022 17:00) (56 - 75)  RR: 15 (22 Dec 2022 17:00) (12 - 31)  SpO2: 94% (22 Dec 2022 17:00) (92% - 100%)    Parameters below as of 22 Dec 2022 10:05  Patient On (Oxygen Delivery Method): nasal cannula  O2 Flow (L/min): 2    I/Os:   I&O's Detail    22 Dec 2022 07:01  -  22 Dec 2022 18:33  --------------------------------------------------------  IN:    IV PiggyBack: 250 mL    Lactated Ringers: 150 mL  Total IN: 400 mL    OUT:    Chest Tube (mL): 0 mL    Voided (mL): 700 mL  Total OUT: 700 mL    Total NET: -300 mL              MEDICATIONS:  MEDICATIONS  (STANDING):  famotidine    Tablet 20 milliGRAM(s) Oral every 12 hours  heparin   Injectable 5000 Unit(s) SubCutaneous every 8 hours  HYDROmorphone PCA (1 mG/mL) 30 milliLiter(s) PCA Continuous PCA Continuous  lactated ringers. 1000 milliLiter(s) (30 mL/Hr) IV Continuous <Continuous>  polyethylene glycol 3350 17 Gram(s) Oral daily  senna 2 Tablet(s) Oral at bedtime    MEDICATIONS  (PRN):  HYDROmorphone PCA (1 mG/mL) Rescue Clinician Bolus 0.5 milliGRAM(s) IV Push every 15 minutes PRN for Pain Scale GREATER THAN 6  naloxone Injectable 0.1 milliGRAM(s) IV Push every 3 minutes PRN For ANY of the following changes in patient status:  A. RR LESS THAN 10 breaths per minute, B. Oxygen saturation LESS THAN 90%, C. Sedation score of 6  ondansetron Injectable 4 milliGRAM(s) IV Push every 6 hours PRN Nausea      LABS:  Laboratory data was independently reviewed by me today.                       RADIOLOGY:   Radiology images were independently reviewed by me today. Reports were reviewed by me today.    Xray Chest 1 View- PORTABLE-Urgent:   ACC: [id number] EXAM:  XR CHEST PORTABLE URGENT 1V                          PROCEDURE DATE:  12/22/2022          INTERPRETATION:  CLINICAL INFORMATION: Post right thoracotomy    TIME OF EXAMINATION: December 22, 2022 at 10:39 AM    EXAM: Portable chest    FINDINGS:  Right-sided chest tube has its tip overlying the apex of the lung. No   effusion or pneumothorax seen. The heart is not enlarged. Small amount of   subcutaneous air in the right chest.    Right paratracheal and hilar widening along the course of the chest tube   likely postop atelectasis/hematoma.        COMPARISON: December 4, 2020        IMPRESSION: Status post right thoracotomy with chest tube.    --- End of Report ---            DUNCAN SINGH MD; Attending Radiologist  This document has been electronically signed. Dec 22 2022 11:16AM (12-22-22 @ 10:45)

## 2022-12-23 NOTE — PATIENT PROFILE ADULT - FUNCTIONAL ASSESSMENT - BASIC MOBILITY 6.
Take prednisone 20 mg, 2 pills in the morning with food for 5 days.  Continue your albuterol inhaler 2 puffs up to every 4 hours if needed for shortness of breath or wheezing.    Viral or Bacterial Bronchitis with Wheezing (Adult)    Bronchitis is an infection of the air passages. It often occurs during a cold and is usually caused by a virus. Symptoms include cough with mucus (phlegm) and low-grade fever. This illness is contagious during the first few days and is spread through the air by coughing and sneezing, or by direct contact (touching the sick person and then touching your own eyes, nose, or mouth).  If there is a lot of inflammation, air flow is restricted. The air passages may also go into spasm, especially if you have asthma. This causes wheezing and difficulty breathing even in people who do not have asthma.  Bronchitis usually lasts 7 to 14 days. The wheezing should improve with treatment during the first week. An inhaler is often prescribed to relax the air passages and stop wheezing. Antibiotics will be prescribed if your doctor thinks there is also a secondary bacterial infection.  Home care    If symptoms are severe, rest at home for the first 2 to 3 days. When you go back to your usual activities, don't let yourself get too tired.    Do not smoke. Also avoid being exposed to secondhand smoke.    You may use over-the-counter medicine to control fever or pain, unless another medicine was prescribed. Note: If you have chronic liver or kidney disease or have ever had a stomach ulcer or gastrointestinal bleeding, talk with your healthcare provider before using these medicines. Also talk to your provider if you are taking medicine to prevent blood clots.) Aspirin should never be given to anyone younger than 18 years of age who is ill with a viral infection or fever. It may cause severe liver or brain damage.    Your appetite may be poor, so a light diet is fine. Avoid dehydration by drinking 6 to 8  glasses of fluids per day (such as water, soft drinks, sports drinks, juices, tea, or soup). Extra fluids will help loosen secretions in the nose and lungs.    Over-the-counter cough, cold, and sore-throat medicines will not shorten the length of the illness, but they may be helpful to reduce symptoms. (Note: Do not use decongestants if you have high blood pressure.)    If you were given an inhaler, use it exactly as directed. If you need to use it more often than prescribed, your condition may be worsening. If this happens, contact your healthcare provider.    If prescribed, finish all antibiotic medicine, even if you are feeling better after only a few days.  Follow-up care  Follow up with your healthcare provider, or as advised. If you had an X-ray or ECG (electrocardiogram), a specialist will review it. You will be notified of any new findings that may affect your care.  If you are age 65 or older, or if you have a chronic lung disease or condition that affects your immune system, or you smoke, ask your healthcare provider about getting a pneumococcal vaccine and a yearly flu shot (influenza vaccine).  When to seek medical advice  Call your healthcare provider right away if any of these occur:    Fever of 100.4 F (38 C) or higher, or as directed by your healthcare provider    Coughing up increasing amounts of colored sputum    Weakness, drowsiness, headache, facial pain, ear pain, or a stiff neck  Call 911  Call 911 if any of these occur.    Coughing up blood    Worsening weakness, drowsiness, headache, or stiff neck    Increased wheezing not helped with medication, shortness of breath, or pain with breathing  Date Last Reviewed: 9/13/2015 2000-2017 [employer]. [address] [place] [address]. All rights reserved. This information is not intended as a substitute for professional medical care. Always follow your healthcare professional's instructions.         4 = No assist / stand by assistance

## 2022-12-23 NOTE — DISCHARGE NOTE PROVIDER - NSDCCPTREATMENT_GEN_ALL_CORE_FT
No
PRINCIPAL PROCEDURE  Procedure: Thorascopic resection of right lung  Findings and Treatment: RLL Lobectomy

## 2022-12-23 NOTE — DISCHARGE NOTE PROVIDER - NSDCMRMEDTOKEN_GEN_ALL_CORE_FT
Chest Xray: Please use this prescription should you choose to go to an out patient imaging center of your choice to have your follow chest xray completes. Please know that should you do this, your chest Xray should not be compelted any earlier than two days prior to discharge.    Please fax results to Dr. Calvin @ [phone]  oxyCODONE 5 mg oral tablet: 1 tab(s) orally every 6 hours, As Needed -Moderate Pain MDD:4 tabs  polyethylene glycol 3350 oral powder for reconstitution: 17 gram(s) orally once a day  senna leaf extract oral tablet: 2 tab(s) orally once a day (at bedtime)

## 2022-12-23 NOTE — DISCHARGE NOTE NURSING/CASE MANAGEMENT/SOCIAL WORK - PATIENT PORTAL LINK FT
You can access the FollowMyHealth Patient Portal offered by [facility] by registering at the following website: [email or web address]. By joining [employer]’s FollowMyHealth portal, you will also be able to view your health information using other applications (apps) compatible with our system.

## 2022-12-23 NOTE — DISCHARGE NOTE PROVIDER - NSDCFUADDINST_GEN_ALL_CORE_FT
WOUND CARE:  Please keep incisions clean and dry. Please do not Scrub or rub incisions. Do not use lotion or powder on incisions  BATHING: Please do not submerge wound underwater. You may shower and/or sponge bathe 48 hours after surgery.  ACTIVITY: No heavy lifting or straining. Otherwise, you may return to your usual level of physical activity. If you are taking narcotic pain medication (such as Oxycodone) DO NOT drive a car, operate machinery or make important decisions.  DIET: Return to your usual diet.  NOTIFY YOUR SURGEON IF: You have any bleeding that does not stop, any shortness of breath that does not stop, any pus draining from your wound(s), any fever (over 100.4 F) or chills, persistent nausea/vomiting, persistent diarrhea, or if your pain is not controlled on your discharge pain medications.  FOLLOW-UP: Please follow up with your primary care physician in one week regarding your hospitalization.  Please follow up with your surgeon.  Call today for appointment in 1 week.

## 2022-12-25 ENCOUNTER — INPATIENT (INPATIENT)
Facility: HOSPITAL | Age: 27
LOS: 4 days | Discharge: HOME | End: 2022-12-30
Attending: THORACIC SURGERY (CARDIOTHORACIC VASCULAR SURGERY) | Admitting: THORACIC SURGERY (CARDIOTHORACIC VASCULAR SURGERY)
Payer: MEDICAID

## 2022-12-25 VITALS
OXYGEN SATURATION: 91 % | RESPIRATION RATE: 28 BRPM | HEART RATE: 118 BPM | HEIGHT: 62 IN | TEMPERATURE: 100 F | DIASTOLIC BLOOD PRESSURE: 74 MMHG | SYSTOLIC BLOOD PRESSURE: 134 MMHG

## 2022-12-25 PROCEDURE — 93010 ELECTROCARDIOGRAM REPORT: CPT

## 2022-12-25 PROCEDURE — 99285 EMERGENCY DEPT VISIT HI MDM: CPT

## 2022-12-25 NOTE — ED ADULT TRIAGE NOTE - CHIEF COMPLAINT QUOTE
Pt c/o of SOB and right side chest pain since yesterday. Pt had a RLL lobectomy 4 days ago and was discharged two days ago. Pulsox 91% on RA, 2L nasal cannula applied with effect pulsox 97%. Pt appears uncomfortable, endorses pain on inspiration with shallow breathing.  Pmhx lung sequestration.

## 2022-12-25 NOTE — ED ADULT TRIAGE NOTE - RESPIRATORY RATE (BREATHS/MIN)
CHIEF COMPLAINT: The patient presents for CEE.  Distance is slightly blurred.    HISTORY OF PRESENT ILLNESS:  agree with tech note, denies pain, redness or discharge and denies vision changes.      ASSESSMENT/PLAN        ASSESSMENT: (H52.13,  H52.203) Myopia of both eyes with astigmatism  (primary encounter diagnosis)  Comment:  Stable Rx  Plan: No change in CL pr glasses Rx  expected rotation in the left eye           28

## 2022-12-26 DIAGNOSIS — Q33.2 SEQUESTRATION OF LUNG: ICD-10-CM

## 2022-12-26 DIAGNOSIS — R07.9 CHEST PAIN, UNSPECIFIED: ICD-10-CM

## 2022-12-26 PROBLEM — N39.0 URINARY TRACT INFECTION, SITE NOT SPECIFIED: Chronic | Status: ACTIVE | Noted: 2022-12-13

## 2022-12-26 PROBLEM — Z22.7 LATENT TUBERCULOSIS: Chronic | Status: ACTIVE | Noted: 2022-12-13

## 2022-12-26 LAB
ALBUMIN SERPL ELPH-MCNC: 3.9 G/DL — SIGNIFICANT CHANGE UP (ref 3.3–5)
ALP SERPL-CCNC: 73 U/L — SIGNIFICANT CHANGE UP (ref 40–120)
ALT FLD-CCNC: 69 U/L — HIGH (ref 4–33)
ANION GAP SERPL CALC-SCNC: 12 MMOL/L — SIGNIFICANT CHANGE UP (ref 7–14)
AST SERPL-CCNC: 31 U/L — SIGNIFICANT CHANGE UP (ref 4–32)
BASOPHILS # BLD AUTO: 0.03 K/UL — SIGNIFICANT CHANGE UP (ref 0–0.2)
BASOPHILS NFR BLD AUTO: 0.2 % — SIGNIFICANT CHANGE UP (ref 0–2)
BILIRUB SERPL-MCNC: 0.2 MG/DL — SIGNIFICANT CHANGE UP (ref 0.2–1.2)
BUN SERPL-MCNC: 9 MG/DL — SIGNIFICANT CHANGE UP (ref 7–23)
CALCIUM SERPL-MCNC: 9.5 MG/DL — SIGNIFICANT CHANGE UP (ref 8.4–10.5)
CHLORIDE SERPL-SCNC: 100 MMOL/L — SIGNIFICANT CHANGE UP (ref 98–107)
CO2 SERPL-SCNC: 23 MMOL/L — SIGNIFICANT CHANGE UP (ref 22–31)
CREAT SERPL-MCNC: 0.49 MG/DL — LOW (ref 0.5–1.3)
EGFR: 134 ML/MIN/1.73M2 — SIGNIFICANT CHANGE UP
EOSINOPHIL # BLD AUTO: 0.09 K/UL — SIGNIFICANT CHANGE UP (ref 0–0.5)
EOSINOPHIL NFR BLD AUTO: 0.6 % — SIGNIFICANT CHANGE UP (ref 0–6)
FLUAV AG NPH QL: SIGNIFICANT CHANGE UP
FLUBV AG NPH QL: SIGNIFICANT CHANGE UP
GLUCOSE SERPL-MCNC: 129 MG/DL — HIGH (ref 70–99)
HCG SERPL-ACNC: <5 MIU/ML — SIGNIFICANT CHANGE UP
HCT VFR BLD CALC: 32.9 % — LOW (ref 34.5–45)
HGB BLD-MCNC: 10.3 G/DL — LOW (ref 11.5–15.5)
IANC: 11.2 K/UL — HIGH (ref 1.8–7.4)
IMM GRANULOCYTES NFR BLD AUTO: 1.2 % — HIGH (ref 0–0.9)
LYMPHOCYTES # BLD AUTO: 1.52 K/UL — SIGNIFICANT CHANGE UP (ref 1–3.3)
LYMPHOCYTES # BLD AUTO: 10.8 % — LOW (ref 13–44)
MCHC RBC-ENTMCNC: 26.1 PG — LOW (ref 27–34)
MCHC RBC-ENTMCNC: 31.3 GM/DL — LOW (ref 32–36)
MCV RBC AUTO: 83.5 FL — SIGNIFICANT CHANGE UP (ref 80–100)
MONOCYTES # BLD AUTO: 1.04 K/UL — HIGH (ref 0–0.9)
MONOCYTES NFR BLD AUTO: 7.4 % — SIGNIFICANT CHANGE UP (ref 2–14)
NEUTROPHILS # BLD AUTO: 11.2 K/UL — HIGH (ref 1.8–7.4)
NEUTROPHILS NFR BLD AUTO: 79.8 % — HIGH (ref 43–77)
NRBC # BLD: 0 /100 WBCS — SIGNIFICANT CHANGE UP (ref 0–0)
NRBC # FLD: 0 K/UL — SIGNIFICANT CHANGE UP (ref 0–0)
PLATELET # BLD AUTO: 428 K/UL — HIGH (ref 150–400)
POTASSIUM SERPL-MCNC: 4.5 MMOL/L — SIGNIFICANT CHANGE UP (ref 3.5–5.3)
POTASSIUM SERPL-SCNC: 4.5 MMOL/L — SIGNIFICANT CHANGE UP (ref 3.5–5.3)
PROT SERPL-MCNC: 7.1 G/DL — SIGNIFICANT CHANGE UP (ref 6–8.3)
RBC # BLD: 3.94 M/UL — SIGNIFICANT CHANGE UP (ref 3.8–5.2)
RBC # FLD: 14.4 % — SIGNIFICANT CHANGE UP (ref 10.3–14.5)
RSV RNA NPH QL NAA+NON-PROBE: SIGNIFICANT CHANGE UP
SARS-COV-2 RNA SPEC QL NAA+PROBE: SIGNIFICANT CHANGE UP
SODIUM SERPL-SCNC: 135 MMOL/L — SIGNIFICANT CHANGE UP (ref 135–145)
WBC # BLD: 14.05 K/UL — HIGH (ref 3.8–10.5)
WBC # FLD AUTO: 14.05 K/UL — HIGH (ref 3.8–10.5)

## 2022-12-26 PROCEDURE — 71275 CT ANGIOGRAPHY CHEST: CPT | Mod: 26

## 2022-12-26 PROCEDURE — 71045 X-RAY EXAM CHEST 1 VIEW: CPT | Mod: 26

## 2022-12-26 RX ORDER — ONDANSETRON 8 MG/1
4 TABLET, FILM COATED ORAL EVERY 6 HOURS
Refills: 0 | Status: DISCONTINUED | OUTPATIENT
Start: 2022-12-26 | End: 2022-12-30

## 2022-12-26 RX ORDER — MORPHINE SULFATE 50 MG/1
4 CAPSULE, EXTENDED RELEASE ORAL ONCE
Refills: 0 | Status: DISCONTINUED | OUTPATIENT
Start: 2022-12-26 | End: 2022-12-26

## 2022-12-26 RX ORDER — HYDROMORPHONE HYDROCHLORIDE 2 MG/ML
1 INJECTION INTRAMUSCULAR; INTRAVENOUS; SUBCUTANEOUS ONCE
Refills: 0 | Status: DISCONTINUED | OUTPATIENT
Start: 2022-12-26 | End: 2022-12-26

## 2022-12-26 RX ORDER — SODIUM CHLORIDE 9 MG/ML
1000 INJECTION INTRAMUSCULAR; INTRAVENOUS; SUBCUTANEOUS
Refills: 0 | Status: DISCONTINUED | OUTPATIENT
Start: 2022-12-26 | End: 2022-12-28

## 2022-12-26 RX ORDER — POLYETHYLENE GLYCOL 3350 17 G/17G
17 POWDER, FOR SOLUTION ORAL DAILY
Refills: 0 | Status: DISCONTINUED | OUTPATIENT
Start: 2022-12-26 | End: 2022-12-30

## 2022-12-26 RX ORDER — HYDROMORPHONE HYDROCHLORIDE 2 MG/ML
0.5 INJECTION INTRAMUSCULAR; INTRAVENOUS; SUBCUTANEOUS ONCE
Refills: 0 | Status: DISCONTINUED | OUTPATIENT
Start: 2022-12-26 | End: 2022-12-26

## 2022-12-26 RX ORDER — HYDROMORPHONE HYDROCHLORIDE 2 MG/ML
30 INJECTION INTRAMUSCULAR; INTRAVENOUS; SUBCUTANEOUS
Refills: 0 | Status: DISCONTINUED | OUTPATIENT
Start: 2022-12-26 | End: 2022-12-28

## 2022-12-26 RX ORDER — KETOROLAC TROMETHAMINE 30 MG/ML
30 SYRINGE (ML) INJECTION EVERY 6 HOURS
Refills: 0 | Status: DISCONTINUED | OUTPATIENT
Start: 2022-12-26 | End: 2022-12-28

## 2022-12-26 RX ORDER — OXYCODONE HYDROCHLORIDE 5 MG/1
5 TABLET ORAL EVERY 4 HOURS
Refills: 0 | Status: DISCONTINUED | OUTPATIENT
Start: 2022-12-26 | End: 2022-12-26

## 2022-12-26 RX ORDER — SODIUM CHLORIDE 9 MG/ML
1000 INJECTION, SOLUTION INTRAVENOUS
Refills: 0 | Status: DISCONTINUED | OUTPATIENT
Start: 2022-12-26 | End: 2022-12-26

## 2022-12-26 RX ORDER — ACETAMINOPHEN 500 MG
975 TABLET ORAL ONCE
Refills: 0 | Status: COMPLETED | OUTPATIENT
Start: 2022-12-26 | End: 2022-12-26

## 2022-12-26 RX ORDER — OXYCODONE HYDROCHLORIDE 5 MG/1
10 TABLET ORAL EVERY 4 HOURS
Refills: 0 | Status: DISCONTINUED | OUTPATIENT
Start: 2022-12-26 | End: 2022-12-26

## 2022-12-26 RX ORDER — NALOXONE HYDROCHLORIDE 4 MG/.1ML
0.1 SPRAY NASAL
Refills: 0 | Status: DISCONTINUED | OUTPATIENT
Start: 2022-12-26 | End: 2022-12-30

## 2022-12-26 RX ORDER — PIPERACILLIN AND TAZOBACTAM 4; .5 G/20ML; G/20ML
3.38 INJECTION, POWDER, LYOPHILIZED, FOR SOLUTION INTRAVENOUS EVERY 8 HOURS
Refills: 0 | Status: DISCONTINUED | OUTPATIENT
Start: 2022-12-26 | End: 2022-12-30

## 2022-12-26 RX ORDER — PIPERACILLIN AND TAZOBACTAM 4; .5 G/20ML; G/20ML
3.38 INJECTION, POWDER, LYOPHILIZED, FOR SOLUTION INTRAVENOUS ONCE
Refills: 0 | Status: COMPLETED | OUTPATIENT
Start: 2022-12-26 | End: 2022-12-26

## 2022-12-26 RX ORDER — ACETAMINOPHEN 500 MG
1000 TABLET ORAL ONCE
Refills: 0 | Status: COMPLETED | OUTPATIENT
Start: 2022-12-26 | End: 2022-12-26

## 2022-12-26 RX ORDER — ACETAMINOPHEN 500 MG
650 TABLET ORAL EVERY 6 HOURS
Refills: 0 | Status: DISCONTINUED | OUTPATIENT
Start: 2022-12-26 | End: 2022-12-27

## 2022-12-26 RX ORDER — KETOROLAC TROMETHAMINE 30 MG/ML
30 SYRINGE (ML) INJECTION ONCE
Refills: 0 | Status: DISCONTINUED | OUTPATIENT
Start: 2022-12-26 | End: 2022-12-26

## 2022-12-26 RX ORDER — HEPARIN SODIUM 5000 [USP'U]/ML
5000 INJECTION INTRAVENOUS; SUBCUTANEOUS EVERY 8 HOURS
Refills: 0 | Status: DISCONTINUED | OUTPATIENT
Start: 2022-12-26 | End: 2022-12-30

## 2022-12-26 RX ORDER — SENNA PLUS 8.6 MG/1
2 TABLET ORAL AT BEDTIME
Refills: 0 | Status: DISCONTINUED | OUTPATIENT
Start: 2022-12-26 | End: 2022-12-30

## 2022-12-26 RX ORDER — HYDROMORPHONE HYDROCHLORIDE 2 MG/ML
0.5 INJECTION INTRAMUSCULAR; INTRAVENOUS; SUBCUTANEOUS
Refills: 0 | Status: DISCONTINUED | OUTPATIENT
Start: 2022-12-26 | End: 2022-12-28

## 2022-12-26 RX ADMIN — MORPHINE SULFATE 4 MILLIGRAM(S): 50 CAPSULE, EXTENDED RELEASE ORAL at 08:46

## 2022-12-26 RX ADMIN — HYDROMORPHONE HYDROCHLORIDE 1 MILLIGRAM(S): 2 INJECTION INTRAMUSCULAR; INTRAVENOUS; SUBCUTANEOUS at 16:16

## 2022-12-26 RX ADMIN — Medication 975 MILLIGRAM(S): at 02:47

## 2022-12-26 RX ADMIN — SENNA PLUS 2 TABLET(S): 8.6 TABLET ORAL at 22:13

## 2022-12-26 RX ADMIN — Medication 30 MILLIGRAM(S): at 13:00

## 2022-12-26 RX ADMIN — HYDROMORPHONE HYDROCHLORIDE 30 MILLILITER(S): 2 INJECTION INTRAMUSCULAR; INTRAVENOUS; SUBCUTANEOUS at 20:35

## 2022-12-26 RX ADMIN — HYDROMORPHONE HYDROCHLORIDE 1 MILLIGRAM(S): 2 INJECTION INTRAMUSCULAR; INTRAVENOUS; SUBCUTANEOUS at 15:39

## 2022-12-26 RX ADMIN — HEPARIN SODIUM 5000 UNIT(S): 5000 INJECTION INTRAVENOUS; SUBCUTANEOUS at 22:13

## 2022-12-26 RX ADMIN — OXYCODONE HYDROCHLORIDE 10 MILLIGRAM(S): 5 TABLET ORAL at 09:54

## 2022-12-26 RX ADMIN — PIPERACILLIN AND TAZOBACTAM 25 GRAM(S): 4; .5 INJECTION, POWDER, LYOPHILIZED, FOR SOLUTION INTRAVENOUS at 14:00

## 2022-12-26 RX ADMIN — Medication 30 MILLIGRAM(S): at 12:24

## 2022-12-26 RX ADMIN — MORPHINE SULFATE 4 MILLIGRAM(S): 50 CAPSULE, EXTENDED RELEASE ORAL at 00:59

## 2022-12-26 RX ADMIN — HYDROMORPHONE HYDROCHLORIDE 1 MILLIGRAM(S): 2 INJECTION INTRAMUSCULAR; INTRAVENOUS; SUBCUTANEOUS at 01:48

## 2022-12-26 RX ADMIN — Medication 400 MILLIGRAM(S): at 22:12

## 2022-12-26 RX ADMIN — PIPERACILLIN AND TAZOBACTAM 200 GRAM(S): 4; .5 INJECTION, POWDER, LYOPHILIZED, FOR SOLUTION INTRAVENOUS at 06:39

## 2022-12-26 RX ADMIN — PIPERACILLIN AND TAZOBACTAM 25 GRAM(S): 4; .5 INJECTION, POWDER, LYOPHILIZED, FOR SOLUTION INTRAVENOUS at 22:13

## 2022-12-26 RX ADMIN — SODIUM CHLORIDE 30 MILLILITER(S): 9 INJECTION INTRAMUSCULAR; INTRAVENOUS; SUBCUTANEOUS at 22:13

## 2022-12-26 RX ADMIN — Medication 975 MILLIGRAM(S): at 08:46

## 2022-12-26 RX ADMIN — HEPARIN SODIUM 5000 UNIT(S): 5000 INJECTION INTRAVENOUS; SUBCUTANEOUS at 14:00

## 2022-12-26 RX ADMIN — HYDROMORPHONE HYDROCHLORIDE 1 MILLIGRAM(S): 2 INJECTION INTRAMUSCULAR; INTRAVENOUS; SUBCUTANEOUS at 08:46

## 2022-12-26 NOTE — ED PROVIDER NOTE - ATTENDING CONTRIBUTION TO CARE
Patient is a 26 yo F with history of Latent TB(2010), Sequestration of Lung now s/p Flexible bronchoscopy, Right uniportal VATS, Right lower lobe lobectomy with Dr Calvin on 12/22/22, discharged on 12/24/22, now returning for right sided chest pain and shortness of breath. Patient reports cramping pain on her right side, difficulty moving on her own and shortness of breath. She reports she has been taking tylenol and oxycodone without relief. She has been able to eat and drink. Denies fevers, chills, nausea, vomiting. No abdominal pain. No history of DVT/ PE.     VS noted  Gen. no acute distress, uncomfortable  HEENT: EOMI, mmm  Lungs: poor air movement otherwise CTAB/L no C/ W /R   CVS: RRR   Chest wall: post-op wound beneath right breast is C/D/I  Abd; Soft non tender, non distended   Ext: no edema, no calf tenderness  Skin: no rash  Neuro AAOx3 non focal clear speech  a/p: post-op pain and shortness of breath Patient is s/p RLL lobectomy, right uniportal VATS. Patient appears to be in pain and is splinting. Will give IV morphine, check CXR, labs and consult CT Surgery.   - Misael GONZALEZ

## 2022-12-26 NOTE — H&P ADULT - NSHPSOCIALHISTORY_GEN_ALL_CORE
Social History:    Substance Use History:  · Substance Use	caffeine  denies  · Caffeine Type	coffee  · Caffeine Amount/Frequency	1-2 cups/cans per day     Alcohol Use History:  · Have you ever consumed alcohol	never     Tobacco Usage:  · Tobacco Usage: Never smoker     Passive Smoke Exposure:  · Passive Smoke Exposure	No

## 2022-12-26 NOTE — H&P ADULT - NSHPPHYSICALEXAM_GEN_ALL_CORE
General: WN/WD NAD  Neurology: A&Ox3, nonfocal, LOYOLA x 4  Respiratory: CTA B/L  CV: RRR, S1S2, no murmurs, rubs or gallops  Abdominal: Soft, NT, ND +BS, Last BM  Extremities: No edema, + peripheral pulses  incision: c,d,i

## 2022-12-26 NOTE — H&P ADULT - NSHPREVIEWOFSYSTEMS_GEN_ALL_CORE
REVIEW OF SYSTEMS:    CONSTITUTIONAL: No weakness, fevers or chills  EYES/ENT: No visual changes;  No vertigo or throat pain   NECK: No pain or stiffness  RESPIRATORY: No cough, wheezing, hemoptysis; No shortness of breath; See HPI  CARDIOVASCULAR: No chest pain or palpitations  GASTROINTESTINAL: No abdominal or epigastric pain. No nausea, vomiting, or hematemesis; No diarrhea or constipation. No melena or hematochezia.  GENITOURINARY: No dysuria, frequency or hematuria  NEUROLOGICAL: No numbness or weakness  SKIN: No itching, rashes, see HPI

## 2022-12-26 NOTE — ED PROVIDER NOTE - NS ED ROS FT
Gen: Denies fever, weight loss  HEENT: Denies vision changes, ear pain, epistaxis, sore throat  CV: Denies palpitations; +chest pain  Skin: Denies rash, erythema, color changes  Resp: Denies cough; +SOB  Endo: Denies sensitivity to heat, cold, increased urination  GI: Denies abdominal pain, constipation, nausea, vomiting, diarrhea  Msk: Denies back pain, LE swelling, extremity pain  : Denies dysuria, increased frequency  Neuro: Denies LOC, weakness, numbness, tingling  Psych: Denies hx of psych, hallucinations  ROS statement: all other ROS negative except as per HPI

## 2022-12-26 NOTE — CONSULT NOTE ADULT - SUBJECTIVE AND OBJECTIVE BOX
Acute Pain Service asked to consult for pain not well controlled. Evaluation also done earlier      HPI:  27 year old female s/p recent RVATS presents to ER w c/o incisional pain. Pt has PMH of Latent TB- 2010 and  Sequestration of Lung s/p flexible bronchoscopy, Right VATS, resection of sequestration lung with Dr Calvin 12/22/2022. Pt's postop course was uneventful. Pt had her CT removed 12/23, post pull CXR was ok and pt was discharged home.  Pt admits pain is worse with activity and when taking a deep breath. Incision clean dry and intact. Labs and imaging reviewed. Plan to admit pt for pain control, antibiotics, will obtain CTPA. (26 Dec 2022 08:00)      PAST MEDICAL & SURGICAL HISTORY:  Gestational diabetes mellitus (GDM) during first pregnancy      Preeclampsia  during first pregnancy      TB lung, latent      Lung sequestration      Urinary tract infection      Normal vaginal delivery      No significant past surgical history          FAMILY HISTORY:  FH: type 2 diabetes (Father)        SOCIAL HISTORY:  [ ] Denies Smoking, Alcohol, or Drug Use    Allergies    No Known Allergies    Intolerances        PAIN MEDICATIONS:  acetaminophen     Tablet .. 650 milliGRAM(s) Oral every 6 hours PRN  HYDROmorphone PCA (1 mG/mL) 30 milliLiter(s) PCA Continuous PCA Continuous  HYDROmorphone PCA (1 mG/mL) Rescue Clinician Bolus 0.5 milliGRAM(s) IV Push every 15 minutes PRN  ketorolac   Injectable 30 milliGRAM(s) IV Push every 6 hours  ondansetron Injectable 4 milliGRAM(s) IV Push every 6 hours PRN  oxyCODONE    IR 10 milliGRAM(s) Oral every 4 hours PRN  oxyCODONE    IR 5 milliGRAM(s) Oral every 4 hours PRN    Heme:  heparin   Injectable 5000 Unit(s) SubCutaneous every 8 hours    Antibiotics:  piperacillin/tazobactam IVPB.. 3.375 Gram(s) IV Intermittent every 8 hours    Cardiovascular:    GI:  polyethylene glycol 3350 17 Gram(s) Oral daily  senna 2 Tablet(s) Oral at bedtime    Endocrine:    All Other Medications:  naloxone Injectable 0.1 milliGRAM(s) IV Push every 3 minutes PRN      Vital Signs Last 24 Hrs  T(C): 36.6 (26 Dec 2022 10:12), Max: 37.8 (25 Dec 2022 23:33)  T(F): 97.9 (26 Dec 2022 10:12), Max: 100.1 (26 Dec 2022 02:08)  HR: 87 (26 Dec 2022 10:12) (78 - 118)  BP: 115/73 (26 Dec 2022 10:12) (101/66 - 134/74)  BP(mean): 75 (26 Dec 2022 07:58) (75 - 75)  RR: 18 (26 Dec 2022 10:12) (12 - 35)  SpO2: 100% (26 Dec 2022 10:12) (91% - 100%)    Parameters below as of 26 Dec 2022 10:12    O2 Flow (L/min): 3      PAIN SCORE:        see chart           Physical Exam: A & O x 3 in some discomfort    LABS:                          10.3   14.05 )-----------( 428      ( 26 Dec 2022 01:29 )             32.9     12-26    135  |  100  |  9   ----------------------------<  129<H>  4.5   |  23  |  0.49<L>    Ca    9.5      26 Dec 2022 01:29    TPro  7.1  /  Alb  3.9  /  TBili  0.2  /  DBili  x   /  AST  31  /  ALT  69<H>  /  AlkPhos  73  12-26          Drug Screen:        [ X]  [place]  Reviewed as per NP note    Impression/Plan: Pain not adequately relieved with current opioid treatment regimen. Agree with plan to begin Hydromorphone IV PCA along with any non-opioid adjuvant analgesic medication that can be added and reevaluate by Pain Service tomorrow.

## 2022-12-26 NOTE — ED ADULT NURSE NOTE - OBJECTIVE STATEMENT
Pt. presented to Trauma C. Pt. A&Ox4 ambulatory. Pt. had RLL lonbectomy on thurday was d/c'ed on sat. tonight began having  extreme pain and SOB. Pt. put on 2L in triage. Pt. noted to be tachypenic and breathing shallowly and seems uncomfortable and in a lot of pain. states pain was not this bad upon leaving the hospital denies abd pain n/v/d dysuria. surgical incision noted under right breast c/d/i. RAC20G labs sent medicated per order

## 2022-12-26 NOTE — ED PROVIDER NOTE - PROGRESS NOTE DETAILS
CT surgery is at bedside. [facility] PGY3: CXR shows b/l pleural effusions; no discrete pneumonia. Discussed with CTSx. States would like to send patient for CTA chest, start zosyn, and admit to their service for pain control.

## 2022-12-26 NOTE — ED PROVIDER NOTE - PHYSICAL EXAMINATION
PHYSICAL EXAM:  GENERAL: uncomfortable appearing; in no respiratory distress  HEENT: Atraumatic, Normocephalic, PERRL, EOMs intact b/l w/out deficits  NECK: No JVD; FROM  CHEST/LUNG: poor respiratory effort, Rt chest wall incision is c/d/i, no s/s of infection, mildly tachypneic  HEART: tachycardia no murmur/gallops/rubs  ABDOMEN: +BS, soft, NT, ND  EXTREMITIES: No LE edema, +2 radial pulses b/l, +2 DP/PT pulses b/l  MUSCULOSKELETAL: FROM of all 4 extremities  NERVOUS SYSTEM:  A&Ox3, No motor deficits or sensory deficits; no focal neurologic deficits  SKIN:  No new rashes

## 2022-12-26 NOTE — ED PROVIDER NOTE - CLINICAL SUMMARY MEDICAL DECISION MAKING FREE TEXT BOX
27-year-old female, past medical history of latent TB, and recently status post right VATS and right lower lobe lobectomy with Dr. Calvin on 12/22/2022 for lung sequestration, presents to ED complaining of worsening shortness of breath and right-sided chest pain since this morning.   Pt is hypoxic to 91% on RA, now on 4LNC, and tachypneic. On exam pt w/ poor respiratory effort, seemingly 2/2 pain.  Likely hypoxic and tachypnea 2/2 splint, low suspicion for PE at this time.  Will check labs, EKG, and CXR. Will also consult CT Surgery. Will give IVF morphine for pain control and reassess. If still clinically concerned may consider CTA chest.

## 2022-12-26 NOTE — ED PROVIDER NOTE - OBJECTIVE STATEMENT
27-year-old female, past medical history of latent TB, and recently status post right VATS and right lower lobe lobectomy with Dr. Calvin on 12/22/2022 for lung sequestration, presents to ED complaining of worsening shortness of breath and right-sided chest pain since this morning.  Patient states that pain is worse when she tries to take a deep breath.  Patient has tried Tylenol and oxycodone at home without relief of her pain.  Patient denies fever, abdominal pain, nausea, vomiting, lower extremity edema, or any other symptoms at this time.

## 2022-12-26 NOTE — H&P ADULT - ASSESSMENT
27 year old female POD 4 s/p flexible bronchoscopy, Right VATS, resection of sequestration lung with Dr Calvin 12/22/2022 admitted with incisional pain.

## 2022-12-27 LAB
ANION GAP SERPL CALC-SCNC: 8 MMOL/L — SIGNIFICANT CHANGE UP (ref 7–14)
BUN SERPL-MCNC: 11 MG/DL — SIGNIFICANT CHANGE UP (ref 7–23)
CALCIUM SERPL-MCNC: 9.2 MG/DL — SIGNIFICANT CHANGE UP (ref 8.4–10.5)
CHLORIDE SERPL-SCNC: 102 MMOL/L — SIGNIFICANT CHANGE UP (ref 98–107)
CO2 SERPL-SCNC: 25 MMOL/L — SIGNIFICANT CHANGE UP (ref 22–31)
CREAT SERPL-MCNC: 0.38 MG/DL — LOW (ref 0.5–1.3)
EGFR: 143 ML/MIN/1.73M2 — SIGNIFICANT CHANGE UP
GLUCOSE SERPL-MCNC: 104 MG/DL — HIGH (ref 70–99)
HCT VFR BLD CALC: 31.3 % — LOW (ref 34.5–45)
HGB BLD-MCNC: 9.6 G/DL — LOW (ref 11.5–15.5)
MCHC RBC-ENTMCNC: 25.4 PG — LOW (ref 27–34)
MCHC RBC-ENTMCNC: 30.7 GM/DL — LOW (ref 32–36)
MCV RBC AUTO: 82.8 FL — SIGNIFICANT CHANGE UP (ref 80–100)
NRBC # BLD: 0 /100 WBCS — SIGNIFICANT CHANGE UP (ref 0–0)
NRBC # FLD: 0 K/UL — SIGNIFICANT CHANGE UP (ref 0–0)
PLATELET # BLD AUTO: 417 K/UL — HIGH (ref 150–400)
POTASSIUM SERPL-MCNC: 3.9 MMOL/L — SIGNIFICANT CHANGE UP (ref 3.5–5.3)
POTASSIUM SERPL-SCNC: 3.9 MMOL/L — SIGNIFICANT CHANGE UP (ref 3.5–5.3)
RBC # BLD: 3.78 M/UL — LOW (ref 3.8–5.2)
RBC # FLD: 14.4 % — SIGNIFICANT CHANGE UP (ref 10.3–14.5)
SODIUM SERPL-SCNC: 135 MMOL/L — SIGNIFICANT CHANGE UP (ref 135–145)
WBC # BLD: 11.6 K/UL — HIGH (ref 3.8–10.5)
WBC # FLD AUTO: 11.6 K/UL — HIGH (ref 3.8–10.5)

## 2022-12-27 PROCEDURE — 71045 X-RAY EXAM CHEST 1 VIEW: CPT | Mod: 26

## 2022-12-27 RX ORDER — IPRATROPIUM/ALBUTEROL SULFATE 18-103MCG
3 AEROSOL WITH ADAPTER (GRAM) INHALATION EVERY 6 HOURS
Refills: 0 | Status: DISCONTINUED | OUTPATIENT
Start: 2022-12-27 | End: 2022-12-30

## 2022-12-27 RX ORDER — GABAPENTIN 400 MG/1
100 CAPSULE ORAL EVERY 8 HOURS
Refills: 0 | Status: DISCONTINUED | OUTPATIENT
Start: 2022-12-27 | End: 2022-12-30

## 2022-12-27 RX ORDER — ACETAMINOPHEN 500 MG
650 TABLET ORAL EVERY 8 HOURS
Refills: 0 | Status: COMPLETED | OUTPATIENT
Start: 2022-12-27 | End: 2022-12-29

## 2022-12-27 RX ORDER — DORNASE ALFA 1 MG/ML
2.5 SOLUTION RESPIRATORY (INHALATION)
Refills: 0 | Status: DISCONTINUED | OUTPATIENT
Start: 2022-12-27 | End: 2022-12-30

## 2022-12-27 RX ADMIN — Medication 30 MILLIGRAM(S): at 13:00

## 2022-12-27 RX ADMIN — Medication 3 MILLILITER(S): at 10:53

## 2022-12-27 RX ADMIN — SENNA PLUS 2 TABLET(S): 8.6 TABLET ORAL at 21:21

## 2022-12-27 RX ADMIN — Medication 30 MILLIGRAM(S): at 00:24

## 2022-12-27 RX ADMIN — Medication 30 MILLIGRAM(S): at 00:55

## 2022-12-27 RX ADMIN — Medication 30 MILLIGRAM(S): at 23:12

## 2022-12-27 RX ADMIN — PIPERACILLIN AND TAZOBACTAM 25 GRAM(S): 4; .5 INJECTION, POWDER, LYOPHILIZED, FOR SOLUTION INTRAVENOUS at 14:36

## 2022-12-27 RX ADMIN — Medication 650 MILLIGRAM(S): at 14:33

## 2022-12-27 RX ADMIN — POLYETHYLENE GLYCOL 3350 17 GRAM(S): 17 POWDER, FOR SOLUTION ORAL at 12:04

## 2022-12-27 RX ADMIN — HEPARIN SODIUM 5000 UNIT(S): 5000 INJECTION INTRAVENOUS; SUBCUTANEOUS at 05:52

## 2022-12-27 RX ADMIN — Medication 650 MILLIGRAM(S): at 22:08

## 2022-12-27 RX ADMIN — GABAPENTIN 100 MILLIGRAM(S): 400 CAPSULE ORAL at 21:23

## 2022-12-27 RX ADMIN — Medication 30 MILLIGRAM(S): at 23:32

## 2022-12-27 RX ADMIN — Medication 30 MILLIGRAM(S): at 17:56

## 2022-12-27 RX ADMIN — Medication 30 MILLIGRAM(S): at 05:52

## 2022-12-27 RX ADMIN — HYDROMORPHONE HYDROCHLORIDE 30 MILLILITER(S): 2 INJECTION INTRAMUSCULAR; INTRAVENOUS; SUBCUTANEOUS at 19:36

## 2022-12-27 RX ADMIN — Medication 3 MILLILITER(S): at 15:10

## 2022-12-27 RX ADMIN — PIPERACILLIN AND TAZOBACTAM 25 GRAM(S): 4; .5 INJECTION, POWDER, LYOPHILIZED, FOR SOLUTION INTRAVENOUS at 05:52

## 2022-12-27 RX ADMIN — Medication 30 MILLIGRAM(S): at 18:20

## 2022-12-27 RX ADMIN — HEPARIN SODIUM 5000 UNIT(S): 5000 INJECTION INTRAVENOUS; SUBCUTANEOUS at 21:21

## 2022-12-27 RX ADMIN — HEPARIN SODIUM 5000 UNIT(S): 5000 INJECTION INTRAVENOUS; SUBCUTANEOUS at 14:28

## 2022-12-27 RX ADMIN — GABAPENTIN 100 MILLIGRAM(S): 400 CAPSULE ORAL at 14:33

## 2022-12-27 RX ADMIN — Medication 650 MILLIGRAM(S): at 21:23

## 2022-12-27 RX ADMIN — Medication 3 MILLILITER(S): at 20:35

## 2022-12-27 RX ADMIN — HYDROMORPHONE HYDROCHLORIDE 30 MILLILITER(S): 2 INJECTION INTRAMUSCULAR; INTRAVENOUS; SUBCUTANEOUS at 07:27

## 2022-12-27 RX ADMIN — PIPERACILLIN AND TAZOBACTAM 25 GRAM(S): 4; .5 INJECTION, POWDER, LYOPHILIZED, FOR SOLUTION INTRAVENOUS at 21:20

## 2022-12-27 RX ADMIN — Medication 650 MILLIGRAM(S): at 15:09

## 2022-12-27 RX ADMIN — DORNASE ALFA 2.5 MILLIGRAM(S): 1 SOLUTION RESPIRATORY (INHALATION) at 20:35

## 2022-12-27 RX ADMIN — Medication 30 MILLIGRAM(S): at 12:04

## 2022-12-27 NOTE — PROVIDER CONTACT NOTE (OTHER) - RECOMMENDATIONS
pt sat up and aggressive chest PT done, encouraged Incentive Spirometer and coughing and deep breathing exercises.

## 2022-12-28 LAB
HCT VFR BLD CALC: 29.6 % — LOW (ref 34.5–45)
HGB BLD-MCNC: 9.3 G/DL — LOW (ref 11.5–15.5)
MCHC RBC-ENTMCNC: 25.9 PG — LOW (ref 27–34)
MCHC RBC-ENTMCNC: 31.4 GM/DL — LOW (ref 32–36)
MCV RBC AUTO: 82.5 FL — SIGNIFICANT CHANGE UP (ref 80–100)
NRBC # BLD: 0 /100 WBCS — SIGNIFICANT CHANGE UP (ref 0–0)
NRBC # FLD: 0 K/UL — SIGNIFICANT CHANGE UP (ref 0–0)
PLATELET # BLD AUTO: 394 K/UL — SIGNIFICANT CHANGE UP (ref 150–400)
RBC # BLD: 3.59 M/UL — LOW (ref 3.8–5.2)
RBC # FLD: 14.4 % — SIGNIFICANT CHANGE UP (ref 10.3–14.5)
WBC # BLD: 10.88 K/UL — HIGH (ref 3.8–10.5)
WBC # FLD AUTO: 10.88 K/UL — HIGH (ref 3.8–10.5)

## 2022-12-28 PROCEDURE — 71045 X-RAY EXAM CHEST 1 VIEW: CPT | Mod: 26

## 2022-12-28 RX ORDER — PANTOPRAZOLE SODIUM 20 MG/1
40 TABLET, DELAYED RELEASE ORAL
Refills: 0 | Status: DISCONTINUED | OUTPATIENT
Start: 2022-12-28 | End: 2022-12-30

## 2022-12-28 RX ORDER — HYDROMORPHONE HYDROCHLORIDE 2 MG/ML
0.5 INJECTION INTRAMUSCULAR; INTRAVENOUS; SUBCUTANEOUS
Refills: 0 | Status: DISCONTINUED | OUTPATIENT
Start: 2022-12-28 | End: 2022-12-28

## 2022-12-28 RX ORDER — OXYCODONE HYDROCHLORIDE 5 MG/1
10 TABLET ORAL
Refills: 0 | Status: DISCONTINUED | OUTPATIENT
Start: 2022-12-28 | End: 2022-12-30

## 2022-12-28 RX ORDER — OXYCODONE HYDROCHLORIDE 5 MG/1
5 TABLET ORAL
Refills: 0 | Status: DISCONTINUED | OUTPATIENT
Start: 2022-12-28 | End: 2022-12-30

## 2022-12-28 RX ORDER — IBUPROFEN 200 MG
400 TABLET ORAL EVERY 6 HOURS
Refills: 0 | Status: DISCONTINUED | OUTPATIENT
Start: 2022-12-28 | End: 2022-12-30

## 2022-12-28 RX ADMIN — Medication 3 MILLILITER(S): at 17:07

## 2022-12-28 RX ADMIN — Medication 650 MILLIGRAM(S): at 21:03

## 2022-12-28 RX ADMIN — HEPARIN SODIUM 5000 UNIT(S): 5000 INJECTION INTRAVENOUS; SUBCUTANEOUS at 21:03

## 2022-12-28 RX ADMIN — PIPERACILLIN AND TAZOBACTAM 25 GRAM(S): 4; .5 INJECTION, POWDER, LYOPHILIZED, FOR SOLUTION INTRAVENOUS at 05:35

## 2022-12-28 RX ADMIN — OXYCODONE HYDROCHLORIDE 10 MILLIGRAM(S): 5 TABLET ORAL at 14:48

## 2022-12-28 RX ADMIN — GABAPENTIN 100 MILLIGRAM(S): 400 CAPSULE ORAL at 21:04

## 2022-12-28 RX ADMIN — GABAPENTIN 100 MILLIGRAM(S): 400 CAPSULE ORAL at 14:48

## 2022-12-28 RX ADMIN — HYDROMORPHONE HYDROCHLORIDE 30 MILLILITER(S): 2 INJECTION INTRAMUSCULAR; INTRAVENOUS; SUBCUTANEOUS at 07:28

## 2022-12-28 RX ADMIN — DORNASE ALFA 2.5 MILLIGRAM(S): 1 SOLUTION RESPIRATORY (INHALATION) at 07:47

## 2022-12-28 RX ADMIN — OXYCODONE HYDROCHLORIDE 10 MILLIGRAM(S): 5 TABLET ORAL at 23:20

## 2022-12-28 RX ADMIN — Medication 3 MILLILITER(S): at 21:17

## 2022-12-28 RX ADMIN — Medication 400 MILLIGRAM(S): at 23:38

## 2022-12-28 RX ADMIN — Medication 650 MILLIGRAM(S): at 15:27

## 2022-12-28 RX ADMIN — Medication 30 MILLIGRAM(S): at 05:34

## 2022-12-28 RX ADMIN — Medication 3 MILLILITER(S): at 07:46

## 2022-12-28 RX ADMIN — PANTOPRAZOLE SODIUM 40 MILLIGRAM(S): 20 TABLET, DELAYED RELEASE ORAL at 14:48

## 2022-12-28 RX ADMIN — DORNASE ALFA 2.5 MILLIGRAM(S): 1 SOLUTION RESPIRATORY (INHALATION) at 21:17

## 2022-12-28 RX ADMIN — PIPERACILLIN AND TAZOBACTAM 25 GRAM(S): 4; .5 INJECTION, POWDER, LYOPHILIZED, FOR SOLUTION INTRAVENOUS at 14:49

## 2022-12-28 RX ADMIN — Medication 3 MILLILITER(S): at 03:15

## 2022-12-28 RX ADMIN — Medication 400 MILLIGRAM(S): at 20:10

## 2022-12-28 RX ADMIN — GABAPENTIN 100 MILLIGRAM(S): 400 CAPSULE ORAL at 05:35

## 2022-12-28 RX ADMIN — Medication 30 MILLIGRAM(S): at 06:05

## 2022-12-28 RX ADMIN — SENNA PLUS 2 TABLET(S): 8.6 TABLET ORAL at 21:04

## 2022-12-28 RX ADMIN — Medication 650 MILLIGRAM(S): at 06:05

## 2022-12-28 RX ADMIN — PIPERACILLIN AND TAZOBACTAM 25 GRAM(S): 4; .5 INJECTION, POWDER, LYOPHILIZED, FOR SOLUTION INTRAVENOUS at 21:03

## 2022-12-28 RX ADMIN — Medication 650 MILLIGRAM(S): at 05:35

## 2022-12-28 RX ADMIN — HEPARIN SODIUM 5000 UNIT(S): 5000 INJECTION INTRAVENOUS; SUBCUTANEOUS at 05:35

## 2022-12-28 RX ADMIN — OXYCODONE HYDROCHLORIDE 10 MILLIGRAM(S): 5 TABLET ORAL at 22:50

## 2022-12-28 RX ADMIN — Medication 400 MILLIGRAM(S): at 19:40

## 2022-12-28 RX ADMIN — OXYCODONE HYDROCHLORIDE 10 MILLIGRAM(S): 5 TABLET ORAL at 15:27

## 2022-12-28 RX ADMIN — Medication 400 MILLIGRAM(S): at 15:27

## 2022-12-28 RX ADMIN — Medication 650 MILLIGRAM(S): at 14:48

## 2022-12-28 RX ADMIN — HEPARIN SODIUM 5000 UNIT(S): 5000 INJECTION INTRAVENOUS; SUBCUTANEOUS at 14:49

## 2022-12-28 RX ADMIN — Medication 650 MILLIGRAM(S): at 21:33

## 2022-12-28 RX ADMIN — Medication 400 MILLIGRAM(S): at 14:48

## 2022-12-28 NOTE — DISCHARGE NOTE PROVIDER - NSDCFUADDAPPT_GEN_ALL_CORE_FT
See Dr Calvin in 2 weeks- call for an appointment and bring a new chest X-ray with you when you come See Dr Calvin in 2 weeks  call for an appointment and bring a new chest X-ray with you when you come

## 2022-12-28 NOTE — DISCHARGE NOTE PROVIDER - NSDCMRMEDTOKEN_GEN_ALL_CORE_FT
Chest Xray: Please use this prescription should you choose to go to an out patient imaging center of your choice to have your follow chest xray completes. Please know that should you do this, your chest Xray should not be compelted any earlier than two days prior to discharge.    Please fax results to Dr. Calvin @ [phone]  oxyCODONE 5 mg oral tablet: 1 tab(s) orally every 6 hours, As Needed -Moderate Pain MDD:4 tabs  polyethylene glycol 3350 oral powder for reconstitution: 17 gram(s) orally once a day  senna leaf extract oral tablet: 2 tab(s) orally once a day (at bedtime)   acetaminophen 325 mg oral tablet: 2 tab(s) orally every 6 hours, As needed, Temp greater or equal to 38C (100.4F), Mild Pain (1 - 3)  amoxicillin-clavulanate 875 mg-125 mg oral tablet: 1 tab(s) orally every 12 hours   Chest Xray: Please use this prescription should you choose to go to an out patient imaging center of your choice to have your follow chest xray completes. Please know that should you do this, your chest Xray should not be compelted any earlier than two days prior to discharge.    Please fax results to Dr. Calvin @ [phone]  gabapentin 100 mg oral capsule: 1 cap(s) orally every 8 hours MDD:3  ibuprofen 400 mg oral tablet: 1 tab(s) orally every 6 hours  oxyCODONE 5 mg oral tablet: 1 tab(s) orally every 4 h As Needed -Moderate Pain (4 - 6) MDD:6  polyethylene glycol 3350 oral powder for reconstitution: 17 gram(s) orally once a day  senna leaf extract oral tablet: 2 tab(s) orally once a day (at bedtime)

## 2022-12-28 NOTE — DISCHARGE NOTE PROVIDER - NSDCFUADDINST_GEN_ALL_CORE_FT
Please walk 4-5 x per day, Increase as tolerated. You may climb stairs. Continue to use incentive spirometer.   You may keep all wounds uncovered. Please shower daily. The suture will be removed in office at follow up apt.   See Dr. Calvin's office in 2 weeks. Please call [phone] for an apt. Please get an CXR the day before your appointment and bring the CD with you to your follow up appointment.  Take pain pills only as needed. Please take a laxative to help support your bowel movements while on pain medication. Laxatives can be available over the counter at your local pharmacy.  Please call the office at [phone] if you have fever's chills, worsening shortness of breath, chest pain, warmth, redness or purulent discharge from the insertion site.

## 2022-12-28 NOTE — DISCHARGE NOTE PROVIDER - NSDCACTIVITY_GEN_ALL_CORE
Return to Work/School allowed/Sex allowed/Do not drive or operate machinery/Showering allowed/Do not make important decisions/Stairs allowed/Walking - Indoors allowed/No heavy lifting/straining/Walking - Outdoors allowed

## 2022-12-28 NOTE — DISCHARGE NOTE PROVIDER - CARE PROVIDER_API CALL
Emile Calvin)  Surgery; Thoracic Surgery  [address] [facility] [place] [address]  Phone: [phone]  Fax: [phone]  Established Patient  Follow Up Time: 2 weeks

## 2022-12-28 NOTE — DISCHARGE NOTE PROVIDER - HOSPITAL COURSE
This 27 year old F with h/o PCOS, Peulmonary sequestration s/p RVATS, RLLobectomy on 12/22/23 presented to the ED c/o pain with deep breathing and motion. Pt was started on antibiotics and had a CT Angio that was negative for a PE. Pt stayed several days for antibiotics and pain control. A R thoracentesis on 12/29 drained 400 mL. This 27 year old F with h/o PCOS, Peulmonary sequestration s/p RVATS, RLLobectomy on 12/22/23 presented to the ED c/o pain with deep breathing and motion. Pt was started on antibiotics and had a CT Angio that was negative for a PE. Pt stayed several days for antibiotics and pain control. A R thoracentesis on 12/29 drained 400 mL.  Pt stable for discharge home to complete course of augmentin and f/u as outpatient.    Vital Signs Last 24 Hrs  T(C): 36.7 (30 Dec 2022 08:57), Max: 38.3 (29 Dec 2022 21:51)  T(F): 98 (30 Dec 2022 08:57), Max: 101 (29 Dec 2022 21:51)  HR: 100 (30 Dec 2022 08:57) (98 - 120)  BP: 110/60 (30 Dec 2022 08:57) (100/59 - 125/80)  BP(mean): --  RR: 18 (30 Dec 2022 08:57) (18 - 18)  SpO2: 100% (30 Dec 2022 08:57) (98% - 100%)    Parameters below as of 30 Dec 2022 08:57  Patient On (Oxygen Delivery Method): room air        General: WN/WD NAD  Neurology: A&Ox3, nonfocal, LOYOLA x 4  Respiratory: CTA B/L  CV: RRR, S1S2, no murmurs, rubs or gallops  Abdominal: Soft, NT, ND +BS, Last BM  Extremities: No edema, + peripheral pulses  Incisions: c,d,i

## 2022-12-29 ENCOUNTER — TRANSCRIPTION ENCOUNTER (OUTPATIENT)
Age: 27
End: 2022-12-29

## 2022-12-29 DIAGNOSIS — J90 PLEURAL EFFUSION, NOT ELSEWHERE CLASSIFIED: ICD-10-CM

## 2022-12-29 LAB
ALBUMIN FLD-MCNC: 2.8 G/DL — SIGNIFICANT CHANGE UP
GLUCOSE FLD-MCNC: 95 MG/DL — SIGNIFICANT CHANGE UP
GRAM STN FLD: SIGNIFICANT CHANGE UP
HCT VFR BLD CALC: 28.3 % — LOW (ref 34.5–45)
HGB BLD-MCNC: 8.7 G/DL — LOW (ref 11.5–15.5)
LDH SERPL L TO P-CCNC: 764 U/L — SIGNIFICANT CHANGE UP
MCHC RBC-ENTMCNC: 25.7 PG — LOW (ref 27–34)
MCHC RBC-ENTMCNC: 30.7 GM/DL — LOW (ref 32–36)
MCV RBC AUTO: 83.7 FL — SIGNIFICANT CHANGE UP (ref 80–100)
NRBC # BLD: 0 /100 WBCS — SIGNIFICANT CHANGE UP (ref 0–0)
NRBC # FLD: 0 K/UL — SIGNIFICANT CHANGE UP (ref 0–0)
PLATELET # BLD AUTO: 375 K/UL — SIGNIFICANT CHANGE UP (ref 150–400)
PROT FLD-MCNC: 4.7 G/DL — SIGNIFICANT CHANGE UP
RBC # BLD: 3.38 M/UL — LOW (ref 3.8–5.2)
RBC # FLD: 14.3 % — SIGNIFICANT CHANGE UP (ref 10.3–14.5)
SPECIMEN SOURCE: SIGNIFICANT CHANGE UP
SURGICAL PATHOLOGY STUDY: SIGNIFICANT CHANGE UP
WBC # BLD: 11.3 K/UL — HIGH (ref 3.8–10.5)
WBC # FLD AUTO: 11.3 K/UL — HIGH (ref 3.8–10.5)

## 2022-12-29 PROCEDURE — 71045 X-RAY EXAM CHEST 1 VIEW: CPT | Mod: 26

## 2022-12-29 PROCEDURE — 99233 SBSQ HOSP IP/OBS HIGH 50: CPT | Mod: 25

## 2022-12-29 PROCEDURE — 32555 ASPIRATE PLEURA W/ IMAGING: CPT

## 2022-12-29 PROCEDURE — 71250 CT THORAX DX C-: CPT | Mod: 26

## 2022-12-29 RX ORDER — OXYCODONE HYDROCHLORIDE 5 MG/1
5 TABLET ORAL ONCE
Refills: 0 | Status: DISCONTINUED | OUTPATIENT
Start: 2022-12-29 | End: 2022-12-30

## 2022-12-29 RX ORDER — ACETAMINOPHEN 500 MG
650 TABLET ORAL EVERY 6 HOURS
Refills: 0 | Status: DISCONTINUED | OUTPATIENT
Start: 2022-12-29 | End: 2022-12-30

## 2022-12-29 RX ORDER — IBUPROFEN 200 MG
200 TABLET ORAL ONCE
Refills: 0 | Status: COMPLETED | OUTPATIENT
Start: 2022-12-29 | End: 2022-12-29

## 2022-12-29 RX ORDER — LIDOCAINE 4 G/100G
1 CREAM TOPICAL EVERY 24 HOURS
Refills: 0 | Status: DISCONTINUED | OUTPATIENT
Start: 2022-12-29 | End: 2022-12-30

## 2022-12-29 RX ADMIN — PIPERACILLIN AND TAZOBACTAM 25 GRAM(S): 4; .5 INJECTION, POWDER, LYOPHILIZED, FOR SOLUTION INTRAVENOUS at 21:44

## 2022-12-29 RX ADMIN — Medication 400 MILLIGRAM(S): at 01:59

## 2022-12-29 RX ADMIN — DORNASE ALFA 2.5 MILLIGRAM(S): 1 SOLUTION RESPIRATORY (INHALATION) at 21:14

## 2022-12-29 RX ADMIN — OXYCODONE HYDROCHLORIDE 10 MILLIGRAM(S): 5 TABLET ORAL at 11:14

## 2022-12-29 RX ADMIN — Medication 3 MILLILITER(S): at 04:10

## 2022-12-29 RX ADMIN — Medication 200 MILLIGRAM(S): at 18:39

## 2022-12-29 RX ADMIN — LIDOCAINE 1 PATCH: 4 CREAM TOPICAL at 05:18

## 2022-12-29 RX ADMIN — Medication 650 MILLIGRAM(S): at 23:00

## 2022-12-29 RX ADMIN — OXYCODONE HYDROCHLORIDE 10 MILLIGRAM(S): 5 TABLET ORAL at 18:00

## 2022-12-29 RX ADMIN — PANTOPRAZOLE SODIUM 40 MILLIGRAM(S): 20 TABLET, DELAYED RELEASE ORAL at 05:17

## 2022-12-29 RX ADMIN — Medication 400 MILLIGRAM(S): at 11:13

## 2022-12-29 RX ADMIN — Medication 3 MILLILITER(S): at 16:37

## 2022-12-29 RX ADMIN — HEPARIN SODIUM 5000 UNIT(S): 5000 INJECTION INTRAVENOUS; SUBCUTANEOUS at 21:45

## 2022-12-29 RX ADMIN — Medication 650 MILLIGRAM(S): at 05:46

## 2022-12-29 RX ADMIN — PIPERACILLIN AND TAZOBACTAM 25 GRAM(S): 4; .5 INJECTION, POWDER, LYOPHILIZED, FOR SOLUTION INTRAVENOUS at 13:53

## 2022-12-29 RX ADMIN — OXYCODONE HYDROCHLORIDE 10 MILLIGRAM(S): 5 TABLET ORAL at 12:20

## 2022-12-29 RX ADMIN — GABAPENTIN 100 MILLIGRAM(S): 400 CAPSULE ORAL at 05:15

## 2022-12-29 RX ADMIN — HEPARIN SODIUM 5000 UNIT(S): 5000 INJECTION INTRAVENOUS; SUBCUTANEOUS at 13:53

## 2022-12-29 RX ADMIN — HEPARIN SODIUM 5000 UNIT(S): 5000 INJECTION INTRAVENOUS; SUBCUTANEOUS at 05:15

## 2022-12-29 RX ADMIN — DORNASE ALFA 2.5 MILLIGRAM(S): 1 SOLUTION RESPIRATORY (INHALATION) at 09:30

## 2022-12-29 RX ADMIN — Medication 400 MILLIGRAM(S): at 16:30

## 2022-12-29 RX ADMIN — Medication 3 MILLILITER(S): at 21:14

## 2022-12-29 RX ADMIN — OXYCODONE HYDROCHLORIDE 10 MILLIGRAM(S): 5 TABLET ORAL at 21:44

## 2022-12-29 RX ADMIN — Medication 650 MILLIGRAM(S): at 22:19

## 2022-12-29 RX ADMIN — GABAPENTIN 100 MILLIGRAM(S): 400 CAPSULE ORAL at 21:45

## 2022-12-29 RX ADMIN — GABAPENTIN 100 MILLIGRAM(S): 400 CAPSULE ORAL at 13:54

## 2022-12-29 RX ADMIN — Medication 400 MILLIGRAM(S): at 05:45

## 2022-12-29 RX ADMIN — OXYCODONE HYDROCHLORIDE 10 MILLIGRAM(S): 5 TABLET ORAL at 22:16

## 2022-12-29 RX ADMIN — Medication 400 MILLIGRAM(S): at 05:15

## 2022-12-29 RX ADMIN — Medication 200 MILLIGRAM(S): at 19:27

## 2022-12-29 RX ADMIN — OXYCODONE HYDROCHLORIDE 5 MILLIGRAM(S): 5 TABLET ORAL at 01:59

## 2022-12-29 RX ADMIN — Medication 3 MILLILITER(S): at 09:30

## 2022-12-29 RX ADMIN — Medication 650 MILLIGRAM(S): at 05:16

## 2022-12-29 RX ADMIN — PIPERACILLIN AND TAZOBACTAM 25 GRAM(S): 4; .5 INJECTION, POWDER, LYOPHILIZED, FOR SOLUTION INTRAVENOUS at 05:15

## 2022-12-29 RX ADMIN — OXYCODONE HYDROCHLORIDE 5 MILLIGRAM(S): 5 TABLET ORAL at 01:19

## 2022-12-29 RX ADMIN — OXYCODONE HYDROCHLORIDE 10 MILLIGRAM(S): 5 TABLET ORAL at 16:58

## 2022-12-29 RX ADMIN — Medication 400 MILLIGRAM(S): at 23:42

## 2022-12-29 NOTE — PROVIDER CONTACT NOTE (OTHER) - ACTION/TREATMENT ORDERED:
provider came to bedside and ordered an additional Oxy 5mg one time dose, 200mg ibuprofen one time dose and lidocaine patch.
cont to monitor. IV Tylenol also ordered.
acetaminophen 650mg

## 2022-12-29 NOTE — PROGRESS NOTE ADULT - REASON FOR ADMISSION
for lung surgery.

## 2022-12-29 NOTE — PROVIDER CONTACT NOTE (OTHER) - SITUATION
pt in pain complain of new onset shortness of breath related to the pain
pt noted to have fever oral 101.
pt requested vitals to be taken again verbalized that she thinks she has a fever.  0830 temp was 99.7 at 2151 temp 101.0, pt says she feels cold.

## 2022-12-29 NOTE — PROCEDURE NOTE - ADDITIONAL PROCEDURE DETAILS
Patient identified, side marked. Local 1% Lidocaine given. Drained 400cc serosang fluid. Fluid studies sent. Hemostasis: Electrocautery

## 2022-12-29 NOTE — PROGRESS NOTE ADULT - SUBJECTIVE AND OBJECTIVE BOX
Subjective: "I'm feeling better but sometimes my pain is still bad" Pt states pain w coughing, ambulating. NO CP or SOb. Getting nebs, chest PT. Ambulating around unit. Pt using IS to 500-750 but instructed to reach 1,000 by the end of the day. Encouraged to use PCA.    Vital Signs:  Vital Signs Last 24 Hrs  T(C): 36.7 (12-27-22 @ 12:25), Max: 38.3 (12-26-22 @ 22:24)  T(F): 98.1 (12-27-22 @ 12:25), Max: 101 (12-26-22 @ 22:24)  HR: 99 (12-27-22 @ 12:25) (74 - 126)  BP: 114/60 (12-27-22 @ 12:25) (101/65 - 114/60)  RR: 19 (12-27-22 @ 12:25) (17 - 22)  SpO2: 97% (12-27-22 @ 12:25) (88% - 100%) on (O2)    Telemetry/Alarms: tele SR.   General: WN/WD NAD  Neurology: Awake, nonfocal, LOYOLA x 4  Eyes: Scleras clear, PERRLA/ EOMI, Gross vision intact  ENT:Gross hearing intact, grossly patent pharynx, no stridor  Neck: Neck supple, trachea midline, No JVD,   Respiratory: Dec BS rt mid to base  CV: RRR, S1S2, no murmurs, rubs or gallops  Abdominal: Soft, NT, ND +BS, + BM this am. Tolerating diet. No n/v.   Extremities: No edema, + peripheral pulses  Skin: No Rashes, Hematoma, Ecchymosis  Lymphatic: No Neck, axilla, groin LAD  Psych: Oriented x 3, normal affect  Incisions: Rt VATS c/d/i, healing.     Relevant labs, radiology and Medications reviewed           CXR- improving atelectasis              9.6    11.60 )-----------( 417      ( 27 Dec 2022 07:32 )             31.3     12-27    135  |  102  |  11  ----------------------------<  104<H>  3.9   |  25  |  0.38<L>    Ca    9.2      27 Dec 2022 07:32    TPro  7.1  /  Alb  3.9  /  TBili  0.2  /  DBili  x   /  AST  31  /  ALT  69<H>  /  AlkPhos  73  12-26      MEDICATIONS  (STANDING):  acetaminophen     Tablet .. 650 milliGRAM(s) Oral every 8 hours  albuterol/ipratropium for Nebulization 3 milliLiter(s) Nebulizer every 6 hours  dornase era Solution 2.5 milliGRAM(s) Inhalation two times a day  gabapentin 100 milliGRAM(s) Oral every 8 hours  heparin   Injectable 5000 Unit(s) SubCutaneous every 8 hours  HYDROmorphone PCA (1 mG/mL) 30 milliLiter(s) PCA Continuous PCA Continuous  ketorolac   Injectable 30 milliGRAM(s) IV Push every 6 hours  piperacillin/tazobactam IVPB.. 3.375 Gram(s) IV Intermittent every 8 hours  polyethylene glycol 3350 17 Gram(s) Oral daily  senna 2 Tablet(s) Oral at bedtime  sodium chloride 0.9%. 1000 milliLiter(s) (30 mL/Hr) IV Continuous <Continuous>    MEDICATIONS  (PRN):  HYDROmorphone PCA (1 mG/mL) Rescue Clinician Bolus 0.5 milliGRAM(s) IV Push every 15 minutes PRN for Pain Scale GREATER THAN 6  naloxone Injectable 0.1 milliGRAM(s) IV Push every 3 minutes PRN For ANY of the following changes in patient status:  A. RR LESS THAN 10 breaths per minute, B. Oxygen saturation LESS THAN 90%, C. Sedation score of 6  ondansetron Injectable 4 milliGRAM(s) IV Push every 6 hours PRN Nausea    Pertinent Physical Exam  I&O's Summary    26 Dec 2022 07:01  -  27 Dec 2022 07:00  --------------------------------------------------------  IN: 1180 mL / OUT: 1000 mL / NET: 180 mL    27 Dec 2022 07:01  -  27 Dec 2022 14:12  --------------------------------------------------------  IN: 320 mL / OUT: 0 mL / NET: 320 mL        Assessment  27y Female  w/ PAST MEDICAL & SURGICAL HISTORY:  Gestational diabetes mellitus (GDM) during first pregnancy      Preeclampsia  during first pregnancy      TB lung, latent      Lung sequestration      Urinary tract infection      Normal vaginal delivery      No significant past surgical history      admitted with complaints of Patient is a 27y old  Female who presents with a chief complaint of for lung surgery. (27 Dec 2022 11:40)  History of Present Illness:   27 year old female s/p recent RVATS presents to ER w c/o incisional pain. Pt has PMH of Latent TB- 2010 and  Sequestration of Lung s/p flexible bronchoscopy, Right VATS, resection of sequestration lung with Dr Calvin 12/22/2022. Pt's postop course was uneventful. Pt had her CT removed 12/23, post pull CXR was ok and pt was discharged home.  Pt admits pain is worse with activity and when taking a deep breath. Incision clean dry and intact. Labs and imaging reviewed. Plan to admit pt for pain control, antibiotics, CT scan.   PLAN  Neuro: Pain management. Pt on PCA. Continue for today. Toradol/tylenol ATC, d/w pain management. ?adding neurontin.   Pulm: Encourage coughing, deep breathing and use of incentive spirometry. Will add nebs, aggressive Chest PT. Amb 4x per day. Daily CXR.   Cardio: Monitor telemetry/alarms  GI: Tolerating diet. Continue stool softeners.  Renal: monitor urine output, supplement electrolytes as needed  Vasc: Heparin SC/SCDs for DVT prophylaxis  Heme: Stable H/H. .   ID: Continue Zosyn, leukocytosis improving.   Therapy: OOB/ambulate  Disposition: Aim to D/C to home once pain controlled and CXR improved.   Discussed with Cardiothoracic Team at AM rounds.  
Day _5__ of Anesthesia Pain Management Service    SUBJECTIVE:    Therapy:	  [x ] IV PCA	   [ ] Epidural           [ ] s/p Spinal Opoid              [ ] Postpartum infusion	  [ ] Patient controlled regional anesthesia (PCRA)    [ ] prn Analgesics    OBJECTIVE:   [ x] No new signs     [ ] Other:    Side Effects:  [x ] None			[ ] Other:    Assessment of Catheter Site:		[ ] Intact		[ ] Other:    ASSESSMENT/PLAN  [x ] Continue current therapy    [ ] Therapy changed to:    [ ] IV PCA       [ ] Epidural     [ ] prn Analgesics     Comments:
Anesthesia Pain Management Service    SUBJECTIVE: Pt doing well with IV PCA without problems reported.    Therapy:	  [ X] IV PCA	   [ ] Epidural           [ ] s/p Spinal Opoid              [ ] Postpartum infusion	  [ ] Patient controlled regional anesthesia (PCRA)    [ ] prn Analgesics    Allergies    No Known Allergies    Intolerances      MEDICATIONS  (STANDING):  acetaminophen     Tablet .. 650 milliGRAM(s) Oral every 8 hours  albuterol/ipratropium for Nebulization 3 milliLiter(s) Nebulizer every 6 hours  dornase era Solution 2.5 milliGRAM(s) Inhalation two times a day  gabapentin 100 milliGRAM(s) Oral every 8 hours  heparin   Injectable 5000 Unit(s) SubCutaneous every 8 hours  ibuprofen  Tablet. 400 milliGRAM(s) Oral every 6 hours  pantoprazole    Tablet 40 milliGRAM(s) Oral before breakfast  piperacillin/tazobactam IVPB.. 3.375 Gram(s) IV Intermittent every 8 hours  polyethylene glycol 3350 17 Gram(s) Oral daily  senna 2 Tablet(s) Oral at bedtime    MEDICATIONS  (PRN):  HYDROmorphone  Injectable 0.5 milliGRAM(s) IV Push every 3 hours PRN Severe breakthrough Pain (7 - 10)  naloxone Injectable 0.1 milliGRAM(s) IV Push every 3 minutes PRN For ANY of the following changes in patient status:  A. RR LESS THAN 10 breaths per minute, B. Oxygen saturation LESS THAN 90%, C. Sedation score of 6  ondansetron Injectable 4 milliGRAM(s) IV Push every 6 hours PRN Nausea  oxyCODONE    IR 5 milliGRAM(s) Oral every 3 hours PRN Moderate Pain (4 - 6)  oxyCODONE    IR 10 milliGRAM(s) Oral every 3 hours PRN Severe Pain (7 - 10)      OBJECTIVE:   [X] No new signs     [ ] Other:    Side Effects:  [X ] None			[ ] Other:    Assessment of Catheter Site:		[ ] Intact		[ ] Other:    ASSESSMENT/PLAN  [ ] Continue current therapy. Recommend non-opioid adjuvant analgesics to be used when possible and when allowed by primary surgical team.    [x ] Therapy changed to:    [ ] IV PCA       [ ] Epidural     [x ] prn Analgesics     Comments:    Progress Note written now but Patient was seen earlier.
   Subjective: "I'm feeling better today. My pain is better" Pt generally looks much better today. Happier. Pt OOB and amb ad bao. Walked multiple times yesterday. No CP or SOB. Using IS now more to 1000. Getting chest pt/neb tx.     Vital Signs:  Vital Signs Last 24 Hrs  T(C): 37.2 (12-28-22 @ 04:30), Max: 37.3 (12-28-22 @ 00:00)  T(F): 98.9 (12-28-22 @ 04:30), Max: 99.1 (12-28-22 @ 00:00)  HR: 99 (12-28-22 @ 04:30) (77 - 100)  BP: 107/61 (12-28-22 @ 04:30) (101/58 - 114/60)  RR: 18 (12-28-22 @ 04:30) (18 - 19)  SpO2: 95% (12-28-22 @ 04:30) (95% - 98%) on (O2)    Telemetry/Alarms: tele SR  General: WN/WD NAD  Neurology: Awake, nonfocal, LOYOLA x 4  Eyes: Scleras clear, PERRLA/ EOMI, Gross vision intact  ENT:Gross hearing intact, grossly patent pharynx, no stridor  Neck: Neck supple, trachea midline, No JVD,   Respiratory: Dec BS bilat bases, R>L  CV: RRR, S1S2, no murmurs, rubs or gallops  Abdominal: Soft, NT, ND +BS, +BM yesterday  Extremities: No edema, + peripheral pulses  Skin: No Rashes, Hematoma, Ecchymosis  Lymphatic: No Neck, axilla, groin LAD  Psych: Oriented x 3, normal affect  Incisions: rt VATS c/d/i.     Relevant labs, radiology and Medications reviewed           CXR- stable bilat pleural effusions.              9.3    10.88 )-----------( 394      ( 28 Dec 2022 06:01 )             29.6     12-27    135  |  102  |  11  ----------------------------<  104<H>  3.9   |  25  |  0.38<L>    Ca    9.2      27 Dec 2022 07:32        MEDICATIONS  (STANDING):  acetaminophen     Tablet .. 650 milliGRAM(s) Oral every 8 hours  albuterol/ipratropium for Nebulization 3 milliLiter(s) Nebulizer every 6 hours  dornase era Solution 2.5 milliGRAM(s) Inhalation two times a day  gabapentin 100 milliGRAM(s) Oral every 8 hours  heparin   Injectable 5000 Unit(s) SubCutaneous every 8 hours  ibuprofen  Tablet. 400 milliGRAM(s) Oral every 6 hours  pantoprazole    Tablet 40 milliGRAM(s) Oral before breakfast  piperacillin/tazobactam IVPB.. 3.375 Gram(s) IV Intermittent every 8 hours  polyethylene glycol 3350 17 Gram(s) Oral daily  senna 2 Tablet(s) Oral at bedtime    MEDICATIONS  (PRN):  HYDROmorphone  Injectable 0.5 milliGRAM(s) IV Push every 3 hours PRN Severe breakthrough Pain (7 - 10)  naloxone Injectable 0.1 milliGRAM(s) IV Push every 3 minutes PRN For ANY of the following changes in patient status:  A. RR LESS THAN 10 breaths per minute, B. Oxygen saturation LESS THAN 90%, C. Sedation score of 6  ondansetron Injectable 4 milliGRAM(s) IV Push every 6 hours PRN Nausea  oxyCODONE    IR 5 milliGRAM(s) Oral every 3 hours PRN Moderate Pain (4 - 6)  oxyCODONE    IR 10 milliGRAM(s) Oral every 3 hours PRN Severe Pain (7 - 10)    Pertinent Physical Exam  I&O's Summary    27 Dec 2022 07:01  -  28 Dec 2022 07:00  --------------------------------------------------------  IN: 1680 mL / OUT: 1000 mL / NET: 680 mL    28 Dec 2022 07:01  -  28 Dec 2022 11:48  --------------------------------------------------------  IN: 0 mL / OUT: 300 mL / NET: -300 mL      Assessment  27y Female  w/ PAST MEDICAL & SURGICAL HISTORY:  Gestational diabetes mellitus (GDM) during first pregnancy      Preeclampsia  during first pregnancy      TB lung, latent      Lung sequestration      Urinary tract infection      Normal vaginal delivery      No significant past surgical history      admitted with complaints of Patient is a 27y old  Female who presents with a chief complaint of for lung surgery. (28 Dec 2022 09:54)  .  On (Date), patient underwent . Postoperative course/issues:  27 year old female s/p recent RVATS presents to ER w c/o incisional pain. Pt has PMH of Latent TB- 2010 and  Sequestration of Lung s/p flexible bronchoscopy, Right VATS, resection of sequestration lung with Dr Calvin 12/22/2022. Pt's postop course was uneventful. Pt had her CT removed 12/23, post pull CXR was ok and pt was discharged home.  Pt admits pain is worse with activity and when taking a deep breath. Incision clean dry and intact. Labs and imaging reviewed. Plan to admit pt for pain control, antibiotics, CT scan.     PLAN  Neuro: Pain management. Will transition to all oral pain management today  Pulm: Encourage coughing, deep breathing and use of incentive spirometry. Cont aggressive Pulm toilet Daily CXR.   Cardio: Monitor telemetry/alarms  GI: Tolerating diet. Continue stool softeners.  Renal: monitor urine output, supplement electrolytes as needed  Vasc: Heparin SC/SCDs for DVT prophylaxis  Heme: Stable H/H. .   ID: Cont Zosyn. Leukocytosis improving.   Therapy: OOB/ambulate  Disposition: Aim to D/C to home in next 24hrs in pain controlled  Discussed with Cardiothoracic Team at AM rounds.  
Anesthesia Pain Management Service    SUBJECTIVE: Patient is doing well with IV PCA and no significant problems reported.    Pain Scale Score	At rest: __5/10_ 	With Activity: ___ 	[X ] Refer to charted pain scores    THERAPY:    [ ] IV PCA Morphine		[ ] 5 mg/mL	[ ] 1 mg/mL  [X ] IV PCA Hydromorphone	[ ] 5 mg/mL	[X ] 1 mg/mL  [ ] IV PCA Fentanyl		[ ] 50 micrograms/mL    Demand dose __0.2_ lockout __6_ (minutes) Continuous Rate _0__ Total: ___10.8   mg used (in past 24 hrs)      MEDICATIONS  (STANDING):  acetaminophen     Tablet .. 650 milliGRAM(s) Oral every 8 hours  albuterol/ipratropium for Nebulization 3 milliLiter(s) Nebulizer every 6 hours  dornase era Solution 2.5 milliGRAM(s) Inhalation two times a day  gabapentin 100 milliGRAM(s) Oral every 8 hours  heparin   Injectable 5000 Unit(s) SubCutaneous every 8 hours  ketorolac   Injectable 30 milliGRAM(s) IV Push every 6 hours  piperacillin/tazobactam IVPB.. 3.375 Gram(s) IV Intermittent every 8 hours  polyethylene glycol 3350 17 Gram(s) Oral daily  senna 2 Tablet(s) Oral at bedtime  sodium chloride 0.9%. 1000 milliLiter(s) (30 mL/Hr) IV Continuous <Continuous>    MEDICATIONS  (PRN):  HYDROmorphone  Injectable 0.5 milliGRAM(s) IV Push every 3 hours PRN Severe breakthrough Pain (7 - 10)  naloxone Injectable 0.1 milliGRAM(s) IV Push every 3 minutes PRN For ANY of the following changes in patient status:  A. RR LESS THAN 10 breaths per minute, B. Oxygen saturation LESS THAN 90%, C. Sedation score of 6  ondansetron Injectable 4 milliGRAM(s) IV Push every 6 hours PRN Nausea  oxyCODONE    IR 5 milliGRAM(s) Oral every 3 hours PRN Moderate Pain (4 - 6)  oxyCODONE    IR 10 milliGRAM(s) Oral every 3 hours PRN Severe Pain (7 - 10)      OBJECTIVE:  Patient is sitting up in chair.     Sedation Score:	[ X] Alert	[ ] Drowsy 	[ ] Arousable	[ ] Asleep	[ ] Unresponsive    Side Effects:	[X ] None	[ ] Nausea	[ ] Vomiting	[ ] Pruritus  		[ ] Other:    Vital Signs Last 24 Hrs  T(C): 37.2 (28 Dec 2022 04:30), Max: 37.3 (28 Dec 2022 00:00)  T(F): 98.9 (28 Dec 2022 04:30), Max: 99.1 (28 Dec 2022 00:00)  HR: 99 (28 Dec 2022 04:30) (74 - 100)  BP: 107/61 (28 Dec 2022 04:30) (101/58 - 114/60)  BP(mean): --  RR: 18 (28 Dec 2022 04:30) (18 - 19)  SpO2: 95% (28 Dec 2022 04:30) (95% - 98%)    Parameters below as of 28 Dec 2022 04:30  Patient On (Oxygen Delivery Method): room air        ASSESSMENT/ PLAN    Therapy to  be:	[ ] Continue   [ X] Discontinued   [X ] Change to prn Analgesics    Documentation and Verification of current medications:   [X] Done	[ ] Not done, not elligible    Comments: IV Dilaudid PCA discontinued as per request of team. PRN Oral/IV opioids and/or Adjuvant non-opioid medication to be ordered at this point.    
Anesthesia Pain Management Service    SUBJECTIVE: Patient is doing well with IV PCA and states her pain is better controlled now.    Pain Scale Score	At rest: __5/10_ 	With Activity: ___ 	[X ] Refer to charted pain scores    THERAPY:    [ ] IV PCA Morphine		[ ] 5 mg/mL	[ ] 1 mg/mL  [X ] IV PCA Hydromorphone	[ ] 5 mg/mL	[X ] 1 mg/mL  [ ] IV PCA Fentanyl		[ ] 50 micrograms/mL    Demand dose __0.2_ lockout __6_ (minutes) Continuous Rate _0__ Total: __8.1_  mg used (in past 24 hours)      MEDICATIONS  (STANDING):  acetaminophen     Tablet .. 650 milliGRAM(s) Oral every 8 hours  albuterol/ipratropium for Nebulization 3 milliLiter(s) Nebulizer every 6 hours  dornase era Solution 2.5 milliGRAM(s) Inhalation two times a day  gabapentin 100 milliGRAM(s) Oral every 8 hours  heparin   Injectable 5000 Unit(s) SubCutaneous every 8 hours  HYDROmorphone PCA (1 mG/mL) 30 milliLiter(s) PCA Continuous PCA Continuous  ketorolac   Injectable 30 milliGRAM(s) IV Push every 6 hours  piperacillin/tazobactam IVPB.. 3.375 Gram(s) IV Intermittent every 8 hours  polyethylene glycol 3350 17 Gram(s) Oral daily  senna 2 Tablet(s) Oral at bedtime  sodium chloride 0.9%. 1000 milliLiter(s) (30 mL/Hr) IV Continuous <Continuous>    MEDICATIONS  (PRN):  HYDROmorphone PCA (1 mG/mL) Rescue Clinician Bolus 0.5 milliGRAM(s) IV Push every 15 minutes PRN for Pain Scale GREATER THAN 6  naloxone Injectable 0.1 milliGRAM(s) IV Push every 3 minutes PRN For ANY of the following changes in patient status:  A. RR LESS THAN 10 breaths per minute, B. Oxygen saturation LESS THAN 90%, C. Sedation score of 6  ondansetron Injectable 4 milliGRAM(s) IV Push every 6 hours PRN Nausea      OBJECTIVE: Patient sitting up in chair    Sedation Score:	[ X] Alert	[ ] Drowsy 	[ ] Arousable	[ ] Asleep	[ ] Unresponsive    Side Effects:	[X ] None	[ ] Nausea	[ ] Vomiting	[ ] Pruritus  		[ ] Other:    Vital Signs Last 24 Hrs  T(C): 36.6 (27 Dec 2022 09:05), Max: 38.3 (26 Dec 2022 22:24)  T(F): 97.8 (27 Dec 2022 09:05), Max: 101 (26 Dec 2022 22:24)  HR: 74 (27 Dec 2022 10:55) (74 - 126)  BP: 101/65 (27 Dec 2022 09:05) (101/65 - 111/61)  BP(mean): --  RR: 17 (27 Dec 2022 09:05) (17 - 22)  SpO2: 96% (27 Dec 2022 10:55) (88% - 100%)    Parameters below as of 27 Dec 2022 10:55  Patient On (Oxygen Delivery Method): room air        ASSESSMENT/ PLAN    Therapy to  be:	[ X] Continue   [ ] Discontinued   [ ] Change to prn Analgesics    Documentation and Verification of current medications:   [X] Done	[ ] Not done, not elligible    Comments: Discussed patient with primary team, continue IV PCA. Gabapentin 100mg Q8H ordered. Recommend non-opioid adjuvant analgesics to be used when possible and when allowed by primary surgical team.    Progress Note written now but Patient was seen earlier.
Patient seen and examined at bedside by Thoracic. Resting in bed, on RA.   Patient in mild pain but has much improved since she has been here.  Patient endorsing no other active complaints at this time.  Denies acute overnight events.     Vital Signs:  Vital Signs Last 24 Hrs  T(C): 37.2 (12-29-22 @ 04:59), Max: 37.5 (12-28-22 @ 20:30)  T(F): 98.9 (12-29-22 @ 04:59), Max: 99.5 (12-28-22 @ 20:30)  HR: 106 (12-29-22 @ 09:30) (92 - 108)  BP: 98/50 (12-29-22 @ 04:59) (98/50 - 111/77)  RR: 18 (12-29-22 @ 04:59) (18 - 18)  SpO2: 96% (12-29-22 @ 09:30) (96% - 100%)     Telemetry/Alarms: tele SR  General: appears stated age, NAD  Neurology: Awake, nonfocal, LOYOLA x 4  Eyes: Scleras clear, PERRLA/ EOMI, Gross vision intact  ENT:Gross hearing intact, grossly patent pharynx, no stridor  Neck: Neck supple, trachea midline, No JVD,   Respiratory: Dec BS bilat bases, R>L  CV: RRR, no murmurs, rubs or gallops  Abdominal: Soft, NT, ND +BS, +BM yesterday  Extremities: No edema, + peripheral pulses  Skin: No Rashes, Hematoma, Ecchymosis  Lymphatic: No Neck, axilla, groin LAD  Psych: Oriented x 3, normal affect  Incisions: rt VATS c/d/i.                  8.7    11.30 )-----------( 375      ( 29 Dec 2022 06:14 )             28.3             MEDICATIONS  (STANDING):  albuterol/ipratropium for Nebulization 3 milliLiter(s) Nebulizer every 6 hours  dornase era Solution 2.5 milliGRAM(s) Inhalation two times a day  gabapentin 100 milliGRAM(s) Oral every 8 hours  heparin   Injectable 5000 Unit(s) SubCutaneous every 8 hours  ibuprofen  Tablet. 400 milliGRAM(s) Oral every 6 hours  ibuprofen  Tablet. 200 milliGRAM(s) Oral once  lidocaine   4% Patch 1 Patch Transdermal every 24 hours  oxyCODONE    IR 5 milliGRAM(s) Oral once  pantoprazole    Tablet 40 milliGRAM(s) Oral before breakfast  piperacillin/tazobactam IVPB.. 3.375 Gram(s) IV Intermittent every 8 hours  polyethylene glycol 3350 17 Gram(s) Oral daily  senna 2 Tablet(s) Oral at bedtime    MEDICATIONS  (PRN):  HYDROmorphone  Injectable 0.5 milliGRAM(s) IV Push every 3 hours PRN Severe breakthrough Pain (7 - 10)  naloxone Injectable 0.1 milliGRAM(s) IV Push every 3 minutes PRN For ANY of the following changes in patient status:  A. RR LESS THAN 10 breaths per minute, B. Oxygen saturation LESS THAN 90%, C. Sedation score of 6  ondansetron Injectable 4 milliGRAM(s) IV Push every 6 hours PRN Nausea  oxyCODONE    IR 5 milliGRAM(s) Oral every 3 hours PRN Moderate Pain (4 - 6)  oxyCODONE    IR 10 milliGRAM(s) Oral every 3 hours PRN Severe Pain (7 - 10)    Pertinent Physical Exam  I&O's Summary    28 Dec 2022 07:01  -  29 Dec 2022 07:00  --------------------------------------------------------  IN: 200 mL / OUT: 1000 mL / NET: -800 mL        Assessment  27 year old female s/p recent RVATS presents to ER w c/o incisional pain. Pt has PMH of Latent TB- 2010 and  Sequestration of Lung s/p flexible bronchoscopy, Right VATS, resection of sequestration lung with Dr Calvin 12/22/2022. Pt's postop course was uneventful. Pt had her CT removed 12/23, post pull CXR was ok and pt was discharged home.  Pt admits pain is worse with activity and when taking a deep breath. Incision clean dry and intact. Labs and imaging reviewed. Plan to admit pt for pain control, antibiotics, CT scan.   12/29 - Repeat CT chest, can d/c if done.    PLAN  Neuro: Motrin 400 ATC. Oxy 5, Oxy 10, Gabapentin.  Pulm: Encourage coughing, deep breathing and use of incentive spirometry. Cont aggressive Pulm toilet Daily CXR.   Cardio: Monitor telemetry/alarms  GI: Tolerating diet. Continue stool softeners.  Renal: monitor urine output, supplement electrolytes as needed  Vasc: Heparin SC/SCDs for DVT prophylaxis  Heme: Stable H/H. .   ID: Cont Zosyn. Leukocytosis improving. Orals for home.  Therapy: OOB/ambulate  Disposition: Aim to D/C to home in next 24hrs in pain controlled  Discussed with Cardiothoracic Team at AM rounds.

## 2022-12-29 NOTE — PROVIDER CONTACT NOTE (OTHER) - BACKGROUND
Readmit s/p Rt VATS for pain control - possible pneumonia.
pt came in for Pt. denies chest pain, paracentesis done today 12/29. pt not compliant with incentive spirometer.
Admit diagnosis chest pain 12/23 R lower lobe resection + VATs. d/c PCA pump 12/28.

## 2022-12-29 NOTE — PROCEDURE NOTE - NSICDXPROCEDURE_GEN_ALL_CORE_FT
Ella with call center called. States pt looking for [facility] lab results. She sending another message.   PROCEDURES:  Right thoracentesis 29-Dec-2022 16:22:50  Rd Jones

## 2022-12-29 NOTE — PROVIDER CONTACT NOTE (OTHER) - ASSESSMENT
Vitals 2150: 101.0F Spo2 99 /80 Pulse 120.
pt c/o pain, muscle tension, unable to cough.
pt having retractions while breathing, verbalized that her breathing is like that because of the pain at the incision site. gave 10mg oxy at 2250 and oxy 5mg at 0119.

## 2022-12-30 ENCOUNTER — TRANSCRIPTION ENCOUNTER (OUTPATIENT)
Age: 27
End: 2022-12-30

## 2022-12-30 VITALS — OXYGEN SATURATION: 99 %

## 2022-12-30 LAB
ANION GAP SERPL CALC-SCNC: 11 MMOL/L — SIGNIFICANT CHANGE UP (ref 7–14)
BUN SERPL-MCNC: 12 MG/DL — SIGNIFICANT CHANGE UP (ref 7–23)
CALCIUM SERPL-MCNC: 9.2 MG/DL — SIGNIFICANT CHANGE UP (ref 8.4–10.5)
CHLORIDE SERPL-SCNC: 100 MMOL/L — SIGNIFICANT CHANGE UP (ref 98–107)
CO2 SERPL-SCNC: 25 MMOL/L — SIGNIFICANT CHANGE UP (ref 22–31)
CREAT SERPL-MCNC: 0.42 MG/DL — LOW (ref 0.5–1.3)
EGFR: 139 ML/MIN/1.73M2 — SIGNIFICANT CHANGE UP
GLUCOSE SERPL-MCNC: 107 MG/DL — HIGH (ref 70–99)
HCT VFR BLD CALC: 29.6 % — LOW (ref 34.5–45)
HGB BLD-MCNC: 9.1 G/DL — LOW (ref 11.5–15.5)
MAGNESIUM SERPL-MCNC: 2.1 MG/DL — SIGNIFICANT CHANGE UP (ref 1.6–2.6)
MCHC RBC-ENTMCNC: 25.1 PG — LOW (ref 27–34)
MCHC RBC-ENTMCNC: 30.7 GM/DL — LOW (ref 32–36)
MCV RBC AUTO: 81.5 FL — SIGNIFICANT CHANGE UP (ref 80–100)
NRBC # BLD: 0 /100 WBCS — SIGNIFICANT CHANGE UP (ref 0–0)
NRBC # FLD: 0 K/UL — SIGNIFICANT CHANGE UP (ref 0–0)
PH FLD: 7.9 — SIGNIFICANT CHANGE UP
PHOSPHATE SERPL-MCNC: 3.8 MG/DL — SIGNIFICANT CHANGE UP (ref 2.5–4.5)
PLATELET # BLD AUTO: 440 K/UL — HIGH (ref 150–400)
POTASSIUM SERPL-MCNC: 4.1 MMOL/L — SIGNIFICANT CHANGE UP (ref 3.5–5.3)
POTASSIUM SERPL-SCNC: 4.1 MMOL/L — SIGNIFICANT CHANGE UP (ref 3.5–5.3)
RBC # BLD: 3.63 M/UL — LOW (ref 3.8–5.2)
RBC # FLD: 14.5 % — SIGNIFICANT CHANGE UP (ref 10.3–14.5)
SODIUM SERPL-SCNC: 136 MMOL/L — SIGNIFICANT CHANGE UP (ref 135–145)
WBC # BLD: 12 K/UL — HIGH (ref 3.8–10.5)
WBC # FLD AUTO: 12 K/UL — HIGH (ref 3.8–10.5)

## 2022-12-30 PROCEDURE — 71045 X-RAY EXAM CHEST 1 VIEW: CPT | Mod: 26

## 2022-12-30 RX ORDER — IBUPROFEN 200 MG
1 TABLET ORAL
Qty: 28 | Refills: 0
Start: 2022-12-30 | End: 2023-01-05

## 2022-12-30 RX ORDER — OXYCODONE HYDROCHLORIDE 5 MG/1
1 TABLET ORAL
Qty: 35 | Refills: 0
Start: 2022-12-30 | End: 2023-01-05

## 2022-12-30 RX ORDER — ACETAMINOPHEN 500 MG
2 TABLET ORAL
Qty: 0 | Refills: 0 | DISCHARGE
Start: 2022-12-30

## 2022-12-30 RX ORDER — GABAPENTIN 400 MG/1
1 CAPSULE ORAL
Qty: 30 | Refills: 0
Start: 2022-12-30 | End: 2023-01-08

## 2022-12-30 RX ADMIN — Medication 3 MILLILITER(S): at 10:50

## 2022-12-30 RX ADMIN — GABAPENTIN 100 MILLIGRAM(S): 400 CAPSULE ORAL at 05:41

## 2022-12-30 RX ADMIN — LIDOCAINE 1 PATCH: 4 CREAM TOPICAL at 05:47

## 2022-12-30 RX ADMIN — Medication 400 MILLIGRAM(S): at 12:56

## 2022-12-30 RX ADMIN — HEPARIN SODIUM 5000 UNIT(S): 5000 INJECTION INTRAVENOUS; SUBCUTANEOUS at 05:41

## 2022-12-30 RX ADMIN — PANTOPRAZOLE SODIUM 40 MILLIGRAM(S): 20 TABLET, DELAYED RELEASE ORAL at 06:18

## 2022-12-30 RX ADMIN — GABAPENTIN 100 MILLIGRAM(S): 400 CAPSULE ORAL at 12:56

## 2022-12-30 RX ADMIN — Medication 400 MILLIGRAM(S): at 05:40

## 2022-12-30 RX ADMIN — PIPERACILLIN AND TAZOBACTAM 25 GRAM(S): 4; .5 INJECTION, POWDER, LYOPHILIZED, FOR SOLUTION INTRAVENOUS at 05:40

## 2022-12-30 RX ADMIN — Medication 400 MILLIGRAM(S): at 06:10

## 2022-12-30 RX ADMIN — Medication 400 MILLIGRAM(S): at 00:12

## 2022-12-30 RX ADMIN — HEPARIN SODIUM 5000 UNIT(S): 5000 INJECTION INTRAVENOUS; SUBCUTANEOUS at 12:56

## 2022-12-30 RX ADMIN — Medication 3 MILLILITER(S): at 04:04

## 2022-12-30 RX ADMIN — OXYCODONE HYDROCHLORIDE 10 MILLIGRAM(S): 5 TABLET ORAL at 12:57

## 2022-12-30 NOTE — DISCHARGE NOTE NURSING/CASE MANAGEMENT/SOCIAL WORK - PATIENT PORTAL LINK FT
You can access the FollowMyHealth Patient Portal offered by [facility] by registering at the following website: [email or web address]. By joining [facility]’s FollowMyHealth portal, you will also be able to view your health information using other applications (apps) compatible with our system.

## 2022-12-30 NOTE — DISCHARGE NOTE NURSING/CASE MANAGEMENT/SOCIAL WORK - NSDCPEFALRISK_GEN_ALL_CORE
For information on Fall & Injury Prevention, visit: [email or web address] OR  [email or web address] OR  https://www.cdc.gov/steadi/patient.html [facility]

## 2022-12-30 NOTE — DISCHARGE NOTE NURSING/CASE MANAGEMENT/SOCIAL WORK - NSDCFUADDAPPT_GEN_ALL_CORE_FT
See Dr Calvin in 2 weeks- call for an appointment and bring a new chest X-ray with you when you come

## 2023-01-03 LAB
CULTURE RESULTS: SIGNIFICANT CHANGE UP
SPECIMEN SOURCE: SIGNIFICANT CHANGE UP

## 2023-01-08 DIAGNOSIS — J90 PLEURAL EFFUSION, NOT ELSEWHERE CLASSIFIED: ICD-10-CM

## 2023-01-10 ENCOUNTER — OUTPATIENT (OUTPATIENT)
Dept: OUTPATIENT SERVICES | Facility: HOSPITAL | Age: 28
LOS: 1 days | End: 2023-01-10
Payer: MEDICAID

## 2023-01-10 ENCOUNTER — APPOINTMENT (OUTPATIENT)
Dept: THORACIC SURGERY | Facility: CLINIC | Age: 28
End: 2023-01-10
Payer: MEDICAID

## 2023-01-10 ENCOUNTER — APPOINTMENT (OUTPATIENT)
Dept: RADIOLOGY | Facility: HOSPITAL | Age: 28
End: 2023-01-10

## 2023-01-10 ENCOUNTER — RESULT REVIEW (OUTPATIENT)
Age: 28
End: 2023-01-10

## 2023-01-10 VITALS
WEIGHT: 159 LBS | HEIGHT: 62 IN | SYSTOLIC BLOOD PRESSURE: 112 MMHG | OXYGEN SATURATION: 98 % | HEART RATE: 89 BPM | DIASTOLIC BLOOD PRESSURE: 76 MMHG | BODY MASS INDEX: 29.26 KG/M2

## 2023-01-10 DIAGNOSIS — Q33.2 SEQUESTRATION OF LUNG: ICD-10-CM

## 2023-01-10 DIAGNOSIS — J90 PLEURAL EFFUSION, NOT ELSEWHERE CLASSIFIED: ICD-10-CM

## 2023-01-10 PROCEDURE — 99024 POSTOP FOLLOW-UP VISIT: CPT

## 2023-01-10 PROCEDURE — 71046 X-RAY EXAM CHEST 2 VIEWS: CPT | Mod: 26

## 2023-01-10 RX ORDER — OXYCODONE 5 MG/1
5 TABLET ORAL
Refills: 0 | Status: ACTIVE | COMMUNITY

## 2023-01-10 RX ORDER — GABAPENTIN 300 MG/1
300 CAPSULE ORAL
Refills: 0 | Status: ACTIVE | COMMUNITY

## 2023-01-11 NOTE — ASSESSMENT
[FreeTextEntry1] : Ms. 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 October, 2022 reported to ED for right flank pain. Found to have UTI. During workup, CT chest showing RLL pulmonary sequestration that is cystic in appearance with air fluid level and has blood supply arising from the aorta. Discharged on a course of Augmentin. Repeat CT Chest in November, showing Right lower lobe pulmonary sequestration with mild internal debris/opacification majority of which is unchanged since October 17, 2022. However, there is significant clearing with marked improved aeration of the sequestration when compared to 2/6/2021.\par \par Followed up with Dr. Smith, concern for developing recurrent infection, which can eventually involve the adjacent normal lung parenchyma. Discussed with patient possible surgical resection vs IR embolization. Referred to office for further evaluation and treatment. \par \par CT Chest on 11/18/22:\par - Re-demonstrated RLL paraspinal opacity with cystic components compatible with area of previously described sequestration demonstrates slight clearing of previously seen debris/fluid within the dominant cystic portion, but is otherwise unchanged when compared to prior chest CT of October 17, 2022.\par - There is marked interval improvement/clearing of the opacified area of sequestration when compared to 2/6/2021.\par - Diffuse hepatic steatosis and a partially imaged liver.\par - Incidental left humeral head 5 mm bone island. No acute osseous abnormality\par \par Now s/p Flexible bronchoscopy, uniportal right video-assisted thoracic surgery, right lower lobe anatomic video assisted thoracic surgery lobectomy, and intercostal nerve block on 12/22/22. Path of Level 9 and 12th LN benign and reactive, path of RLL revealed area showing bronchiolar lined spaces in a background of otherwise normal-appearing lung tissue.  By clinical report this lobe of lung receives circulation from the aorta and is therefore a pulmonary sequestration.  While sequestrations can be acquired, this area of bronchiolized lung suggests an area of benign malformation that raises the possibility that this sequestration was in fact congenital.  This is not definitive, and therefore clinical correlation and history would be needed to distinguish an acquired from a congenital sequestration -   Lymph node, benign and reactive. \par \par Patient was discharged 12/23/22 and re admitted 12/26/22 p/w pain with deep breathing and motion. Patient was started on abx and pain control, CTA negative for PE, found to have pleural effusion s/p Right Thoracentesis 12/29/22 drained 400 ml, discharged 12/30/22 with course of augmentin.\par \par Chest x ray 1/10/23: improved right pleural effusion \par \par Patient is here today for a post op visit. C/o SOB with exertion. Patient denies pain, fever, cough, hemoptysis. Overall, feeling well.\par \par I have reviewed the patient's medical records and diagnostic images at time of this office consultation and have made the following recommendation:\par 1. Path revealed with patient: pulmonary sequestration \par 2. Chest x ray reviewed: mild R pleural effusion, F/U with chest x ray in 1 month\par \par I, Dr. LANDON, [facility], personally performed the evaluation and management (E/M) services for this established patient who presents today with (a) new problem(s)/exacerbation of (an) existing condition(s). That E/M includes conducting the examination, assessing all new/exacerbated conditions, and establishing a new plan of care.  Today, my RN, Sammy Felton, was here to observe my evaluation and management services for this new problem/exacerbated condition to be followed going forward.\par

## 2023-01-11 NOTE — DISCUSSION/SUMMARY
[Doing Well] : is doing well [Excellent Pain Control] : has excellent pain control [No Sign of Infection] : is showing no signs of infection [Remove Sutures/Staples] : removed sutures/staples [Clinical Recheck] : clinical recheck

## 2023-01-11 NOTE — ASSESSMENT
[FreeTextEntry1] : Ms. 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 October, 2022 reported to ED for right flank pain. Found to have UTI. During workup, CT chest showing RLL pulmonary sequestration that is cystic in appearance with air fluid level and has blood supply arising from the aorta. Discharged on a course of Augmentin. Repeat CT Chest in November, showing Right lower lobe pulmonary sequestration with mild internal debris/opacification majority of which is unchanged since October 17, 2022. However, there is significant clearing with marked improved aeration of the sequestration when compared to 2/6/2021.\par \par Followed up with Dr. Smith, concern for developing recurrent infection, which can eventually involve the adjacent normal lung parenchyma. Discussed with patient possible surgical resection vs IR embolization. Referred to office for further evaluation and treatment. \par \par CT Chest on 11/18/22:\par - Re-demonstrated RLL paraspinal opacity with cystic components compatible with area of previously described sequestration demonstrates slight clearing of previously seen debris/fluid within the dominant cystic portion, but is otherwise unchanged when compared to prior chest CT of October 17, 2022.\par - There is marked interval improvement/clearing of the opacified area of sequestration when compared to 2/6/2021.\par - Diffuse hepatic steatosis and a partially imaged liver.\par - Incidental left humeral head 5 mm bone island. No acute osseous abnormality\par \par Now s/p Flexible bronchoscopy, uniportal right video-assisted thoracic surgery, right lower lobe anatomic video assisted thoracic surgery lobectomy, and intercostal nerve block on 12/22/22. Path of Level 9 and 12th LN benign and reactive, path of RLL revealed area showing bronchiolar lined spaces in a background of otherwise normal-appearing lung tissue.  By clinical report this lobe of lung receives circulation from the aorta and is therefore a pulmonary sequestration.  While sequestrations can be acquired, this area of bronchiolized lung suggests an area of benign malformation that raises the possibility that this sequestration was in fact congenital.  This is not definitive, and therefore clinical correlation and history would be needed to distinguish an acquired from a congenital sequestration -   Lymph node, benign and reactive. \par \par Patient was discharged 12/23/22 and re admitted 12/26/22 p/w pain with deep breathing and motion. Patient was started on abx and pain control, CTA negative for PE, found to have pleural effusion s/p Right Thoracentesis 12/29/22 drained 400 ml, discharged 12/30/22 with course of augmentin.\par \par Chest x ray 1/10/23: improved right pleural effusion \par \par Patient is here today for a post op visit. C/o SOB with exertion. Patient denies pain, fever, cough, hemoptysis. Overall, feeling well.\par \par I have reviewed the patient's medical records and diagnostic images at time of this office consultation and have made the following recommendation:\par 1. Path revealed with patient: pulmonary sequestration \par 2. Chest x ray reviewed: mild R pleural effusion, F/U with chest x ray in 1 month\par \par I, Dr. LANDON, [facility], personally performed the evaluation and management (E/M) services for this established patient who presents today with (a) new problem(s)/exacerbation of (an) existing condition(s). That E/M includes conducting the examination, assessing all new/exacerbated conditions, and establishing a new plan of care.  Today, my RN, Sammy Felton, was here to observe my evaluation and management services for this new problem/exacerbated condition to be followed going forward.\par

## 2023-01-11 NOTE — CONSULT LETTER
[Dear  ___] : Dear  [unfilled], [Courtesy Letter:] : I had the pleasure of seeing your patient, [unfilled], in my office today. [Please see my note below.] : Please see my note below. [Sincerely,] : Sincerely, [FreeTextEntry2] :  Dr. Kendall Smith [FreeTextEntry3] : Emile Calvin MD, FACS \par Chief, Division of Thoracic Surgery \par Director, Minimally Invasive Thoracic Surgery \par Department of Cardiovascular and Thoracic Surgery \par [facility] \par , Cardiovascular and Thoracic Surgery\par

## 2023-01-11 NOTE — REASON FOR VISIT
[de-identified] : Flexible bronchoscopy, uniportal right video-assisted thoracic surgery, right lower lobe anatomic video_assisted thoracic surgery lobectomy, and intercostal nerve block [de-identified] : 12/22/22

## 2023-01-11 NOTE — REASON FOR VISIT
[de-identified] : Flexible bronchoscopy, uniportal right video-assisted thoracic surgery, right lower lobe anatomic video_assisted thoracic surgery lobectomy, and intercostal nerve block [de-identified] : 12/22/22

## 2023-01-11 NOTE — REASON FOR VISIT
[de-identified] : Flexible bronchoscopy, uniportal right video-assisted thoracic surgery, right lower lobe anatomic video_assisted thoracic surgery lobectomy, and intercostal nerve block [de-identified] : 12/22/22

## 2023-01-11 NOTE — CONSULT LETTER
[Dear  ___] : Dear  [unfilled], [Courtesy Letter:] : I had the pleasure of seeing your patient, [unfilled], in my office today. [Please see my note below.] : Please see my note below. [Sincerely,] : Sincerely, [FreeTextEntry2] :  Dr. Kendall Smith [FreeTextEntry3] : Emile Calvin MD, FACS \par Chief, Division of Thoracic Surgery \par Director, Minimally Invasive Thoracic Surgery \par Department of Cardiovascular and Thoracic Surgery \par [facility] \par , Cardiovascular and Thoracic Surgery\par

## 2023-01-11 NOTE — CONSULT LETTER
[Dear  ___] : Dear  [unfilled], [Courtesy Letter:] : I had the pleasure of seeing your patient, [unfilled], in my office today. [Please see my note below.] : Please see my note below. [Sincerely,] : Sincerely, [FreeTextEntry2] :  Dr. Kendall Smith [FreeTextEntry3] : Emile Calvin MD, FACS \par Chief, Division of Thoracic Surgery \par Director, Minimally Invasive Thoracic Surgery \par Department of Cardiovascular and Thoracic Surgery \par [facility] \par , Cardiovascular and Thoracic Surgery\par

## 2023-01-11 NOTE — ASSESSMENT
[FreeTextEntry1] : Ms. 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 October, 2022 reported to ED for right flank pain. Found to have UTI. During workup, CT chest showing RLL pulmonary sequestration that is cystic in appearance with air fluid level and has blood supply arising from the aorta. Discharged on a course of Augmentin. Repeat CT Chest in November, showing Right lower lobe pulmonary sequestration with mild internal debris/opacification majority of which is unchanged since October 17, 2022. However, there is significant clearing with marked improved aeration of the sequestration when compared to 2/6/2021.\par \par Followed up with Dr. Smith, concern for developing recurrent infection, which can eventually involve the adjacent normal lung parenchyma. Discussed with patient possible surgical resection vs IR embolization. Referred to office for further evaluation and treatment. \par \par CT Chest on 11/18/22:\par - Re-demonstrated RLL paraspinal opacity with cystic components compatible with area of previously described sequestration demonstrates slight clearing of previously seen debris/fluid within the dominant cystic portion, but is otherwise unchanged when compared to prior chest CT of October 17, 2022.\par - There is marked interval improvement/clearing of the opacified area of sequestration when compared to 2/6/2021.\par - Diffuse hepatic steatosis and a partially imaged liver.\par - Incidental left humeral head 5 mm bone island. No acute osseous abnormality\par \par Now s/p Flexible bronchoscopy, uniportal right video-assisted thoracic surgery, right lower lobe anatomic video assisted thoracic surgery lobectomy, and intercostal nerve block on 12/22/22. Path of Level 9 and 12th LN benign and reactive, path of RLL revealed area showing bronchiolar lined spaces in a background of otherwise normal-appearing lung tissue.  By clinical report this lobe of lung receives circulation from the aorta and is therefore a pulmonary sequestration.  While sequestrations can be acquired, this area of bronchiolized lung suggests an area of benign malformation that raises the possibility that this sequestration was in fact congenital.  This is not definitive, and therefore clinical correlation and history would be needed to distinguish an acquired from a congenital sequestration -   Lymph node, benign and reactive. \par \par Patient was discharged 12/23/22 and re admitted 12/26/22 p/w pain with deep breathing and motion. Patient was started on abx and pain control, CTA negative for PE, found to have pleural effusion s/p Right Thoracentesis 12/29/22 drained 400 ml, discharged 12/30/22 with course of augmentin.\par \par Chest x ray 1/10/23: improved right pleural effusion \par \par Patient is here today for a post op visit. C/o SOB with exertion. Patient denies pain, fever, cough, hemoptysis. Overall, feeling well.\par \par I have reviewed the patient's medical records and diagnostic images at time of this office consultation and have made the following recommendation:\par 1. Path revealed with patient: pulmonary sequestration \par 2. Chest x ray reviewed: mild R pleural effusion, F/U with chest x ray in 1 month\par \par I, Dr. LANDON, [facility], personally performed the evaluation and management (E/M) services for this established patient who presents today with (a) new problem(s)/exacerbation of (an) existing condition(s). That E/M includes conducting the examination, assessing all new/exacerbated conditions, and establishing a new plan of care.  Today, my RN, Sammy Felton, was here to observe my evaluation and management services for this new problem/exacerbated condition to be followed going forward.\par

## 2023-01-13 NOTE — ASU PATIENT PROFILE, ADULT - ARRIVAL TIME
Warm epsom salt soaks three times daily   Podiatry follow up for removal of the ingrown toenail.   Take the keflex as directed for the infection  Tylenol or motrin as directed for pain as needed  Go to ER for new or worsening concerns
05:00

## 2023-01-28 LAB
CULTURE RESULTS: SIGNIFICANT CHANGE UP
SPECIMEN SOURCE: SIGNIFICANT CHANGE UP

## 2023-02-09 NOTE — ASSESSMENT
[FreeTextEntry1] : Ms. 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 October, 2022 reported to ED for right flank pain. Found to have UTI. During workup, CT chest showing RLL pulmonary sequestration that is cystic in appearance with air fluid level and has blood supply arising from the aorta. Discharged on a course of Augmentin. Repeat CT Chest in November, showing Right lower lobe pulmonary sequestration with mild internal debris/opacification majority of which is unchanged since October 17, 2022. However, there is significant clearing with marked improved aeration of the sequestration when compared to 2/6/2021.\par \par Followed up with Dr. Smith, concern for developing recurrent infection, which can eventually involve the adjacent normal lung parenchyma. Discussed with patient possible surgical resection vs IR embolization. Referred to office for further evaluation and treatment. \par \par CT Chest on 11/18/22:\par - Re-demonstrated RLL paraspinal opacity with cystic components compatible with area of previously described sequestration demonstrates slight clearing of previously seen debris/fluid within the dominant cystic portion, but is otherwise unchanged when compared to prior chest CT of October 17, 2022.\par - There is marked interval improvement/clearing of the opacified area of sequestration when compared to 2/6/2021.\par - Diffuse hepatic steatosis and a partially imaged liver.\par - Incidental left humeral head 5 mm bone island. No acute osseous abnormality\par \par Now s/p Flexible bronchoscopy, uniportal right video-assisted thoracic surgery, right lower lobe anatomic video assisted thoracic surgery lobectomy, and intercostal nerve block on 12/22/22. Path of Level 9 and 12th LN benign and reactive, path of RLL revealed area showing bronchiolar lined spaces in a background of otherwise normal-appearing lung tissue.  By clinical report this lobe of lung receives circulation from the aorta and is therefore a pulmonary sequestration.  While sequestrations can be acquired, this area of bronchiolized lung suggests an area of benign malformation that raises the possibility that this sequestration was in fact congenital.  This is not definitive, and therefore clinical correlation and history would be needed to distinguish an acquired from a congenital sequestration -   Lymph node, benign and reactive. \par \par Patient was discharged 12/23/22 and re admitted 12/26/22 p/w pain with deep breathing and motion. Patient was started on abx and pain control, CTA negative for PE, found to have pleural effusion s/p Right Thoracentesis 12/29/22 drained 400 ml, discharged 12/30/22 with course of augmentin.\par \par Chest x ray 1/10/23: improved right pleural effusion \par \par CXR on 2/14/23:\par \par Here today with follow up CXR.

## 2023-02-09 NOTE — REASON FOR VISIT
[de-identified] : Flexible bronchoscopy, uniportal right video-assisted thoracic surgery, right lower lobe anatomic video_assisted thoracic surgery lobectomy, and intercostal nerve block [de-identified] : 12/22/22

## 2023-02-09 NOTE — CONSULT LETTER
[Dear  ___] : Dear  [unfilled], [Courtesy Letter:] : I had the pleasure of seeing your patient, [unfilled], in my office today. [Please see my note below.] : Please see my note below. [Sincerely,] : Sincerely, [FreeTextEntry2] :  Dr. Kendall Smith [FreeTextEntry3] : Emile Calvin MD, FACS \par Chief, Division of Thoracic Surgery \par Director, Minimally Invasive Thoracic Surgery \par Department of Cardiovascular and Thoracic Surgery \par [facility] \par , Cardiovascular and Thoracic Surgery\par

## 2023-02-14 ENCOUNTER — APPOINTMENT (OUTPATIENT)
Dept: THORACIC SURGERY | Facility: CLINIC | Age: 28
End: 2023-02-14

## 2023-04-18 NOTE — OB PROVIDER H&P - NSHPROSALLOTHERNEGRD_GEN_ALL_CORE
Endoscopy Note          Operative Report    Patient: Qiana Leung MRN: [id number]      YOB: 1970  Age: 48 y.o. Sex: female            Indications:   Epigastric abdominal pain. Guaiac positive stool. Blood loss anemia    Preoperative Evaluation: The patient was evaluated prior to the procedure in the GI lab admission area, the patient ASA was recorded . Consent was obtained from the patient with the risk of perforation bleeding and aspiration. Anesthesia: MIRYAM-per anesthesia    Findings: Normal upper and mid and distal esophageal mucosa normal GE junction. Mild generalized gastritis with inferior prepyloric antral ulcer covered with yellow fibrin David class III classification. Biopsies from the antrum and gastric body was taken. Normal descending duodenum mucosa. Postoperative Diagnosis: Gastric ulcer    69411 Esophagogastroduodenoscopy, Flexible, transoral; biopsy; single or multiple      Recommendations: Await Pathology proton pump inhibitor. Recommend colonoscopy as outpatient when her pulmonary status improves.       Signed By:  Preethi Vera MD     April 18, 2023 All other review of systems negative, except as noted in HPI

## 2023-06-15 NOTE — ED PROVIDER NOTE - TRANSFER CONSULTATION #1
will see patient in ED
MD Lopez:  patient seen and evaluated personally.   I agree with the History & Physical,  Impression & Plan other than what was detailed in my note.  MD Lopez

## 2024-05-08 NOTE — DISCHARGE NOTE PROVIDER - NSDCHC_MEDRECSTATUS_GEN_ALL_CORE
Admission Reconciliation is Completed  Discharge Reconciliation is Not Complete 0 Admission Reconciliation is Completed  Discharge Reconciliation is Completed

## 2024-05-31 ENCOUNTER — NON-APPOINTMENT (OUTPATIENT)
Age: 29
End: 2024-05-31

## 2024-11-12 ENCOUNTER — NON-APPOINTMENT (OUTPATIENT)
Age: 29
End: 2024-11-12

## 2025-02-27 NOTE — PROCEDURE NOTE - NSTIMEOUT_GEN_A_CORE
Medication passed protocol, however, refill RN could not approve because patient or pharmacy requested a 90 day supply, which is outside the RN protocol. Provider, please approve or deny the prescription.   
Patient's first and last name, , procedure, and correct site confirmed prior to the start of procedure.

## 2025-03-06 NOTE — ED PROVIDER NOTE - ATTESTATION, MLM
Condition:: Biopsy Results 2/20/25
I have reviewed and confirmed nurses' notes for patient's medications, allergies, medical history, and surgical history.

## (undated) DEVICE — SYR LUER LOK 10CC

## (undated) DEVICE — POSITIONER STRAP ARMBOARD VELCRO TS-30

## (undated) DEVICE — SOL ANTI FOG

## (undated) DEVICE — ENDOCATCH GENERAL 15MM (PURPLE)

## (undated) DEVICE — CONNECTOR REDUCING STRAIGHT 3/8X0.25"

## (undated) DEVICE — DRSG STERISTRIPS 0.5 X 4"

## (undated) DEVICE — WARMING BLANKET LOWER ADULT

## (undated) DEVICE — SHEARS HARMONIC 1100 5MM X 20CM CURVED TIP

## (undated) DEVICE — ELCTR BOVIE TIP BLADE INSULATED 6.5" EDGE

## (undated) DEVICE — DRSG TELFA 3 X 8

## (undated) DEVICE — SUT VICRYL 3-0 27" SH UNDYED

## (undated) DEVICE — TAPE SILK 3"

## (undated) DEVICE — DRSG TEGADERM 4X4.75"

## (undated) DEVICE — ELCTR GROUNDING PAD ADULT COVIDIEN

## (undated) DEVICE — PROTECTOR HEEL / ELBOW FLUFFY

## (undated) DEVICE — PACK MAJOR ABDOMINAL W ENDO DRAPE

## (undated) DEVICE — ADAPTER FIBEROPTIC BRONCHOSCOPE DUAL AXIS SWIVEL

## (undated) DEVICE — NDL HYPO REGULAR BEVEL 25G X 1.5" (BLUE)

## (undated) DEVICE — SYR SLIP 10CC

## (undated) DEVICE — SUT VICRYL 0 27" CT-1 UNDYED

## (undated) DEVICE — SUT MONOCRYL 4-0 27" PS-2 UNDYED

## (undated) DEVICE — DRSG BENZOIN 0.6CC

## (undated) DEVICE — DRAPE MAYO STAND 23"

## (undated) DEVICE — DRSG CURITY GAUZE SPONGE 4 X 4" 12-PLY

## (undated) DEVICE — SOL IRR POUR NS 0.9% 500ML

## (undated) DEVICE — DISSECTOR ENDOSCOPIC KITTNER SINGLE TIP

## (undated) DEVICE — TUBING SUCTION NONCONDUCTIVE 6MM X 12FT

## (undated) DEVICE — SUT PROLENE 0 30" CT-1

## (undated) DEVICE — DURABLE MEDICAL EQUIPMENT: Type: DURABLE MEDICAL EQUIPMENT

## (undated) DEVICE — DRAPE IOBAN 33" X 23"

## (undated) DEVICE — PREP CHLORAPREP HI-LITE ORANGE 26ML

## (undated) DEVICE — STAPLER ECHELON FLEX POWERED ADV PLCMT TIP

## (undated) DEVICE — SUT VICRYL 2-0 27" UR-6

## (undated) DEVICE — APPL LAPAROSCOPIC EXTENDED 35CM

## (undated) DEVICE — ELCTR EXTENSION STRAIGHT

## (undated) DEVICE — STAPLER ETHICON ECHELON FLEX 45MM X 340MM

## (undated) DEVICE — DRAPE MAGNETIC INSTRUMENT MEDIUM

## (undated) DEVICE — VALVE BIOPSY BRONCHOVIDEOSCOPE

## (undated) DEVICE — TRAP SPECIMEN SPUTUM 40CC

## (undated) DEVICE — DRAPE 3/4 SHEET 52X76"

## (undated) DEVICE — VALVE SUCTION EVIS 160/200/240

## (undated) DEVICE — SPECIMEN CONTAINER 100ML

## (undated) DEVICE — FOLEY TRAY 16FR 5CC LF UMETER CLOSED

## (undated) DEVICE — VISITEC 4X4

## (undated) DEVICE — VENODYNE/SCD SLEEVE CALF MEDIUM

## (undated) DEVICE — ELCTR BOVIE TIP BLADE INSULATED 2.75" EDGE

## (undated) DEVICE — CHEST DRAIN PLEUR-EVAC DRY/WET ADULT-PEDS SINGLE (QUICK)